# Patient Record
Sex: FEMALE | Race: WHITE | Employment: FULL TIME | URBAN - METROPOLITAN AREA
[De-identification: names, ages, dates, MRNs, and addresses within clinical notes are randomized per-mention and may not be internally consistent; named-entity substitution may affect disease eponyms.]

---

## 2023-06-05 ENCOUNTER — HOSPITAL ENCOUNTER (OUTPATIENT)
Age: 58
Discharge: HOME | End: 2023-06-05

## 2023-06-05 LAB — RUBV IGG SERPL IA-ACNC: 160.9 IU/ML

## 2023-06-07 LAB
MEV IGG SER-ACNC: 56.5 AU/ML
MUV IGG TITR SER: 3.74 {TITER}
VZV IGG SER QL IA: 3.17

## 2023-07-17 ENCOUNTER — OFFICE VISIT (OUTPATIENT)
Dept: FAMILY MEDICINE CLINIC | Age: 58
End: 2023-07-17
Payer: COMMERCIAL

## 2023-07-17 VITALS
WEIGHT: 122.4 LBS | TEMPERATURE: 98.6 F | RESPIRATION RATE: 16 BRPM | HEART RATE: 86 BPM | DIASTOLIC BLOOD PRESSURE: 82 MMHG | SYSTOLIC BLOOD PRESSURE: 118 MMHG | OXYGEN SATURATION: 98 %

## 2023-07-17 DIAGNOSIS — G25.0 ESSENTIAL TREMOR: Primary | ICD-10-CM

## 2023-07-17 DIAGNOSIS — Z00.00 ROUTINE HEALTH MAINTENANCE: ICD-10-CM

## 2023-07-17 DIAGNOSIS — J45.909 UNCOMPLICATED ASTHMA, UNSPECIFIED ASTHMA SEVERITY, UNSPECIFIED WHETHER PERSISTENT: ICD-10-CM

## 2023-07-17 DIAGNOSIS — N95.1 MENOPAUSAL SYMPTOMS: ICD-10-CM

## 2023-07-17 DIAGNOSIS — E78.5 HYPERLIPIDEMIA, UNSPECIFIED HYPERLIPIDEMIA TYPE: ICD-10-CM

## 2023-07-17 PROCEDURE — 99204 OFFICE O/P NEW MOD 45 MIN: CPT | Performed by: STUDENT IN AN ORGANIZED HEALTH CARE EDUCATION/TRAINING PROGRAM

## 2023-07-17 RX ORDER — ESTRADIOL 0.1 MG/G
CREAM VAGINAL
Qty: 42.5 G | Refills: 3 | Status: SHIPPED | OUTPATIENT
Start: 2023-07-17

## 2023-07-17 RX ORDER — PRIMIDONE 50 MG/1
50 TABLET ORAL 3 TIMES DAILY
Qty: 270 TABLET | Refills: 2 | Status: SHIPPED | OUTPATIENT
Start: 2023-07-17

## 2023-07-17 RX ORDER — ALBUTEROL SULFATE 90 UG/1
2 AEROSOL, METERED RESPIRATORY (INHALATION) 4 TIMES DAILY PRN
Qty: 18 G | Refills: 5 | Status: SHIPPED | OUTPATIENT
Start: 2023-07-17

## 2023-07-17 RX ORDER — FLUTICASONE PROPIONATE AND SALMETEROL 250; 50 UG/1; UG/1
1 POWDER RESPIRATORY (INHALATION)
COMMUNITY
Start: 2023-06-28 | End: 2023-07-17 | Stop reason: SDUPTHER

## 2023-07-17 RX ORDER — PROPRANOLOL HYDROCHLORIDE 10 MG/1
10 TABLET ORAL 3 TIMES DAILY
Qty: 270 TABLET | Refills: 2 | Status: SHIPPED | OUTPATIENT
Start: 2023-07-17

## 2023-07-17 RX ORDER — FLUTICASONE PROPIONATE AND SALMETEROL 250; 50 UG/1; UG/1
1 POWDER RESPIRATORY (INHALATION) 2 TIMES DAILY
Qty: 60 EACH | Refills: 3 | Status: SHIPPED | OUTPATIENT
Start: 2023-07-17

## 2023-07-17 RX ORDER — MONTELUKAST SODIUM 10 MG/1
10 TABLET ORAL DAILY
Qty: 90 TABLET | Refills: 2 | Status: SHIPPED | OUTPATIENT
Start: 2023-07-17

## 2023-07-17 SDOH — ECONOMIC STABILITY: INCOME INSECURITY: HOW HARD IS IT FOR YOU TO PAY FOR THE VERY BASICS LIKE FOOD, HOUSING, MEDICAL CARE, AND HEATING?: NOT HARD AT ALL

## 2023-07-17 SDOH — ECONOMIC STABILITY: FOOD INSECURITY: WITHIN THE PAST 12 MONTHS, YOU WORRIED THAT YOUR FOOD WOULD RUN OUT BEFORE YOU GOT MONEY TO BUY MORE.: NEVER TRUE

## 2023-07-17 SDOH — ECONOMIC STABILITY: HOUSING INSECURITY
IN THE LAST 12 MONTHS, WAS THERE A TIME WHEN YOU DID NOT HAVE A STEADY PLACE TO SLEEP OR SLEPT IN A SHELTER (INCLUDING NOW)?: NO

## 2023-07-17 SDOH — ECONOMIC STABILITY: FOOD INSECURITY: WITHIN THE PAST 12 MONTHS, THE FOOD YOU BOUGHT JUST DIDN'T LAST AND YOU DIDN'T HAVE MONEY TO GET MORE.: NEVER TRUE

## 2023-07-17 ASSESSMENT — PATIENT HEALTH QUESTIONNAIRE - PHQ9
1. LITTLE INTEREST OR PLEASURE IN DOING THINGS: 0
2. FEELING DOWN, DEPRESSED OR HOPELESS: 0
SUM OF ALL RESPONSES TO PHQ QUESTIONS 1-9: 0
SUM OF ALL RESPONSES TO PHQ QUESTIONS 1-9: 0
SUM OF ALL RESPONSES TO PHQ9 QUESTIONS 1 & 2: 0
SUM OF ALL RESPONSES TO PHQ QUESTIONS 1-9: 0
SUM OF ALL RESPONSES TO PHQ QUESTIONS 1-9: 0

## 2023-07-17 NOTE — PATIENT INSTRUCTIONS
Where to get your labs: 4 options  There are several locations near the family medicine clinic. Imaging services are at the main hospital (see 3rd option below)    1. [facility] stand-alone building with parking out front  [address] ([place], just across the street from the Emergency Dept / [address] [place] [address])  [facility], [address] [facility]  P: [phone]    Hours:  Casi Modest 6:00AM - 6:00PM  Fri 6 AM to 4 PM  Saturday 6:30AM - Noon      2. [facility] on the second floor of same building where you had your family medicine appointment  [address] [facility]. [address], [address] [place] [address]  P: [phone]    Hours:  Mon - Thu 8:30AM-3:00PM  Closed for lunch - 12:00pm - 1:00PM  Fri - Closed    3. [facility]  Can also get x-ray, CT scans, other imaging here  [address] [facility], [address] [place] [address],[address]  Tel: [phone]    Hours:  Emeka Birminghamt - Fri 6am to 5pm  Saturday - 6:00 am - 12:00 pm *xray,ct, other imaging services not available at this location on weekends. Sunday - Closed    4. [facility] and imaging centers:  [facility]/locations/specialty-locations/lab-and-imaging-centers  *Please call before you go to double check times and whether they can definitely obtain labs for you.

## 2023-07-17 NOTE — PROGRESS NOTES
[address] [address]  Dept: [phone]  Dept Fax: [phone]: [phone]  Resident Note    Assessment & Plan     1. Essential tremor    2. Hyperlipidemia, unspecified hyperlipidemia type    3. Routine health maintenance    4. Menopausal symptoms    5.  Uncomplicated asthma, unspecified asthma severity, unspecified whether persistent       Orders Placed This Encounter    CBC with Auto Differential     Standing Status:   Future     Number of Occurrences:   1     Standing Expiration Date:   7/16/2024    Comprehensive Metabolic Panel     Standing Status:   Future     Number of Occurrences:   1     Standing Expiration Date:   7/17/2024    Lipid, Fasting     Standing Status:   Future     Number of Occurrences:   1     Standing Expiration Date:   7/17/2024    TSH with Reflex     Standing Status:   Future     Number of Occurrences:   1     Standing Expiration Date:   7/16/2024    Vitamin B12 & Folate     Standing Status:   Future     Number of Occurrences:   1     Standing Expiration Date:   7/17/2024    Adam Washington DO, Pain Medicine, [place]     Referral Priority:   Routine     Referral Type:   Eval and Treat     Referral Reason:   Specialty Services Required     Referred to Provider:   Dylan Bauman DO     Requested Specialty:   Pain Medicine     Number of Visits Requested:   1    B COMPLEX-C PO     Sig: Take by mouth    CALCIUM CARBONATE-VITAMIN D PO     Sig: Take 1 tablet by mouth daily    Coenzyme Q10-Vitamin E 100-300 MG-UNIT WAFR     Sig: Take 200 mg by mouth daily    DISCONTD: fluticasone-salmeterol (ADVAIR) 250-50 MCG/ACT AEPB diskus inhaler     Sig: Inhale 1 puff into the lungs    NIACIN CR PO     Sig: Take by mouth    RED YEAST RICE EXTRACT PO     Sig: Take by mouth    primidone (MYSOLINE) 50 MG tablet     Sig: Take 1 tablet by mouth 3 times daily     Dispense:  270 tablet

## 2023-07-18 ENCOUNTER — HOSPITAL ENCOUNTER (OUTPATIENT)
Age: 58
Discharge: HOME | End: 2023-07-18
Payer: COMMERCIAL

## 2023-07-18 DIAGNOSIS — Z00.00 ROUTINE HEALTH MAINTENANCE: ICD-10-CM

## 2023-07-18 DIAGNOSIS — G25.0 ESSENTIAL TREMOR: ICD-10-CM

## 2023-07-18 DIAGNOSIS — E78.5 HYPERLIPIDEMIA, UNSPECIFIED HYPERLIPIDEMIA TYPE: ICD-10-CM

## 2023-07-18 LAB
ALBUMIN SERPL BCG-MCNC: 4.4 G/DL (ref 3.5–5.1)
ALP SERPL-CCNC: 107 U/L (ref 38–126)
ALT SERPL W/O P-5'-P-CCNC: 17 U/L (ref 11–66)
ANION GAP SERPL CALC-SCNC: 8 MEQ/L (ref 8–16)
AST SERPL-CCNC: 16 U/L (ref 5–40)
BASOPHILS ABSOLUTE: 0 THOU/MM3 (ref 0–0.1)
BASOPHILS NFR BLD AUTO: 0.6 %
BILIRUB SERPL-MCNC: 0.2 MG/DL (ref 0.3–1.2)
BUN SERPL-MCNC: 12 MG/DL (ref 7–22)
CALCIUM SERPL-MCNC: 9.8 MG/DL (ref 8.5–10.5)
CHLORIDE SERPL-SCNC: 104 MEQ/L (ref 98–111)
CHOLESTEROL, FASTING: 220 MG/DL (ref 100–199)
CO2 SERPL-SCNC: 28 MEQ/L (ref 23–33)
CREAT SERPL-MCNC: 0.6 MG/DL (ref 0.4–1.2)
DEPRECATED RDW RBC AUTO: 44.6 FL (ref 35–45)
EOSINOPHIL NFR BLD AUTO: 4.7 %
EOSINOPHILS ABSOLUTE: 0.3 THOU/MM3 (ref 0–0.4)
ERYTHROCYTE [DISTWIDTH] IN BLOOD BY AUTOMATED COUNT: 13.2 % (ref 11.5–14.5)
FOLATE SERPL-MCNC: 15.4 NG/ML (ref 4.8–24.2)
GFR SERPL CREATININE-BSD FRML MDRD: > 60 ML/MIN/1.73M2
GLUCOSE SERPL-MCNC: 93 MG/DL (ref 70–108)
HCT VFR BLD AUTO: 43.3 % (ref 37–47)
HDLC SERPL-MCNC: 72 MG/DL
HGB BLD-MCNC: 14.1 GM/DL (ref 12–16)
IMM GRANULOCYTES # BLD AUTO: 0.02 THOU/MM3 (ref 0–0.07)
IMM GRANULOCYTES NFR BLD AUTO: 0.3 %
LDLC SERPL CALC-MCNC: 131 MG/DL
LYMPHOCYTES ABSOLUTE: 2.2 THOU/MM3 (ref 1–4.8)
LYMPHOCYTES NFR BLD AUTO: 34.7 %
MCH RBC QN AUTO: 30.1 PG (ref 26–33)
MCHC RBC AUTO-ENTMCNC: 32.6 GM/DL (ref 32.2–35.5)
MCV RBC AUTO: 92.3 FL (ref 81–99)
MONOCYTES ABSOLUTE: 0.3 THOU/MM3 (ref 0.4–1.3)
MONOCYTES NFR BLD AUTO: 5.4 %
NEUTROPHILS NFR BLD AUTO: 54.3 %
NRBC BLD AUTO-RTO: 0 /100 WBC
PLATELET # BLD AUTO: 319 THOU/MM3 (ref 130–400)
PMV BLD AUTO: 9.3 FL (ref 9.4–12.4)
POTASSIUM SERPL-SCNC: 4.8 MEQ/L (ref 3.5–5.2)
PROT SERPL-MCNC: 7.2 G/DL (ref 6.1–8)
RBC # BLD AUTO: 4.69 MILL/MM3 (ref 4.2–5.4)
SEGMENTED NEUTROPHILS ABSOLUTE COUNT: 3.4 THOU/MM3 (ref 1.8–7.7)
SODIUM SERPL-SCNC: 140 MEQ/L (ref 135–145)
TRIGLYCERIDE, FASTING: 86 MG/DL (ref 0–199)
TSH SERPL DL<=0.005 MIU/L-ACNC: 2.16 UIU/ML (ref 0.4–4.2)
VIT B12 SERPL-MCNC: 440 PG/ML (ref 211–911)
WBC # BLD AUTO: 6.2 THOU/MM3 (ref 4.8–10.8)

## 2023-07-18 PROCEDURE — 36415 COLL VENOUS BLD VENIPUNCTURE: CPT

## 2023-07-18 PROCEDURE — 82746 ASSAY OF FOLIC ACID SERUM: CPT

## 2023-07-18 PROCEDURE — 82607 VITAMIN B-12: CPT

## 2023-07-18 PROCEDURE — 85025 COMPLETE CBC W/AUTO DIFF WBC: CPT

## 2023-07-18 PROCEDURE — 80061 LIPID PANEL: CPT

## 2023-07-18 PROCEDURE — 84443 ASSAY THYROID STIM HORMONE: CPT

## 2023-07-18 PROCEDURE — 80053 COMPREHEN METABOLIC PANEL: CPT

## 2023-07-19 PROBLEM — Z90.49 S/P APPENDECTOMY: Status: ACTIVE | Noted: 2022-11-17

## 2023-07-19 PROBLEM — F51.5 NIGHTMARES ASSOCIATED WITH CHRONIC POST-TRAUMATIC STRESS DISORDER: Status: ACTIVE | Noted: 2023-01-26

## 2023-07-19 PROBLEM — F43.12 NIGHTMARES ASSOCIATED WITH CHRONIC POST-TRAUMATIC STRESS DISORDER: Status: ACTIVE | Noted: 2023-01-26

## 2023-07-19 PROBLEM — F43.10 PTSD (POST-TRAUMATIC STRESS DISORDER): Status: ACTIVE | Noted: 2023-01-26

## 2023-07-19 PROBLEM — Z90.710 HX OF TOTAL HYSTERECTOMY: Status: ACTIVE | Noted: 2019-02-15

## 2023-07-19 PROBLEM — K21.9 GERD (GASTROESOPHAGEAL REFLUX DISEASE): Status: ACTIVE | Noted: 2022-01-04

## 2023-07-19 RX ORDER — LACTOBACILLUS RHAMNOSUS GG 10B CELL
CAPSULE ORAL
COMMUNITY
Start: 2022-02-10 | End: 2024-02-10

## 2023-07-19 RX ORDER — GARLIC 180 MG
TABLET, DELAYED RELEASE (ENTERIC COATED) ORAL
COMMUNITY

## 2023-07-19 RX ORDER — VITAMIN E 268 MG
400 CAPSULE ORAL DAILY
COMMUNITY

## 2023-07-19 ASSESSMENT — ENCOUNTER SYMPTOMS
COUGH: 0
ABDOMINAL PAIN: 0
SHORTNESS OF BREATH: 0

## 2023-07-20 ENCOUNTER — TELEPHONE (OUTPATIENT)
Dept: FAMILY MEDICINE CLINIC | Age: 58
End: 2023-07-20

## 2023-07-20 NOTE — TELEPHONE ENCOUNTER
----- Message from Honor Buerger, MD sent at 7/19/2023  9:02 AM EDT -----  Cholesterol is slightly high - would advise reducing dietary fat intake. Rest of labs are great.

## 2023-09-05 ENCOUNTER — OFFICE VISIT (OUTPATIENT)
Dept: FAMILY MEDICINE CLINIC | Age: 58
End: 2023-09-05
Payer: COMMERCIAL

## 2023-09-05 VITALS
HEART RATE: 68 BPM | HEIGHT: 60 IN | WEIGHT: 116.4 LBS | OXYGEN SATURATION: 98 % | BODY MASS INDEX: 22.85 KG/M2 | DIASTOLIC BLOOD PRESSURE: 70 MMHG | SYSTOLIC BLOOD PRESSURE: 110 MMHG | TEMPERATURE: 98.2 F | RESPIRATION RATE: 16 BRPM

## 2023-09-05 DIAGNOSIS — J01.90 ACUTE BACTERIAL SINUSITIS: Primary | ICD-10-CM

## 2023-09-05 DIAGNOSIS — B96.89 ACUTE BACTERIAL SINUSITIS: Primary | ICD-10-CM

## 2023-09-05 PROCEDURE — 99213 OFFICE O/P EST LOW 20 MIN: CPT

## 2023-09-05 RX ORDER — PREDNISONE 20 MG/1
40 TABLET ORAL DAILY
Qty: 10 TABLET | Refills: 0 | Status: SHIPPED | OUTPATIENT
Start: 2023-09-05 | End: 2023-09-10

## 2023-09-05 RX ORDER — AMOXICILLIN AND CLAVULANATE POTASSIUM 875; 125 MG/1; MG/1
1 TABLET, FILM COATED ORAL 2 TIMES DAILY
Qty: 14 TABLET | Refills: 0 | Status: SHIPPED | OUTPATIENT
Start: 2023-09-05 | End: 2023-09-12

## 2023-09-05 ASSESSMENT — ENCOUNTER SYMPTOMS
SINUS PAIN: 1
RHINORRHEA: 1
DIARRHEA: 1
SORE THROAT: 1
ABDOMINAL PAIN: 0
CONSTIPATION: 0
SHORTNESS OF BREATH: 1

## 2023-09-05 NOTE — PATIENT INSTRUCTIONS
Thank you   Thank you for trusting us with your healthcare needs. You may receive a survey regarding today's visit. It would help us out if you would take a few moments to provide your feedback. We value your input. Please bring in ALL medications BOTTLES, including inhalers, herbal supplements, over the counter, prescribed & non-prescribed medicine. The office would like actual medication bottles and a list.   Please note our OFFICE POLICIES:   Prior to getting your labs drawn, please check with your insurance company for benefits and eligibility of lab services. Often, insurance companies cover certain tests for preventative visits only. It is patient's responsibility to see what is covered. We are unable to change a diagnosis after the test has been performed. Please hold onto your original lab orders and take them to your lab to be completed. If you no show your scheduled appointment three times, you will be dismissed from this practice. Reschedules must be completed 24 hours prior to your schedule appointment. If the list below has been completed, PLEASE FAX RECORDS TO OUR OFFICE @ [phone].  Once the records have been received we will update your records at our office:  Health Maintenance Due   Topic Date Due    HIV screen  Never done    Hepatitis C screen  Never done    Breast cancer screen  Never done    Colorectal Cancer Screen  Never done    Shingles vaccine (2 of 3) 03/10/2017    COVID-19 Vaccine (4 - Booster for Moderna series) 01/03/2022    Flu vaccine (1) 08/01/2023

## 2023-09-25 ENCOUNTER — OFFICE VISIT (OUTPATIENT)
Dept: PHYSICAL MEDICINE AND REHAB | Age: 58
End: 2023-09-25
Payer: COMMERCIAL

## 2023-09-25 VITALS
HEIGHT: 60 IN | WEIGHT: 119 LBS | SYSTOLIC BLOOD PRESSURE: 112 MMHG | BODY MASS INDEX: 23.36 KG/M2 | DIASTOLIC BLOOD PRESSURE: 78 MMHG

## 2023-09-25 DIAGNOSIS — M47.812 CERVICAL SPONDYLOSIS: ICD-10-CM

## 2023-09-25 DIAGNOSIS — G24.3 CERVICAL DYSTONIA: Primary | ICD-10-CM

## 2023-09-25 DIAGNOSIS — G25.0 ESSENTIAL TREMOR: ICD-10-CM

## 2023-09-25 DIAGNOSIS — G89.29 OTHER CHRONIC PAIN: ICD-10-CM

## 2023-09-25 PROCEDURE — 99204 OFFICE O/P NEW MOD 45 MIN: CPT | Performed by: ANESTHESIOLOGY

## 2023-09-25 NOTE — PROGRESS NOTES
Functionality Assessment/Goals Worksheet     On a scale of 0 (Does not Interfere) to 10 (Completely Interferes)     1. Which number describes how during the past week pain has interfered with           the following:  A. General Activity:  0  B. Mood: 0  C. Walking Ability:  0  D. Normal Work (Includes both work outside the home and housework):  0  E. Relations with Other People:   0  F. Sleep:   0  G. Enjoyment of Life:   0    2. Patient Prefers to Take their Pain Medications:     []  On a regular basis   []  Only when necessary    [x]  Does not take pain medications    3. What are the Patient's Goals/Expectations for Visiting Pain Management?      []  Learn about my pain    []  Receive Medication   []  Physical Therapy     []  Treat Depression   [x]  Receive Injections    []  Treat Sleep   []  Deal with Anxiety and Stress   []  Treat Opoid Dependence/Addiction   []  Other:

## 2023-09-25 NOTE — PROGRESS NOTES
Chronic Pain/PM&R Clinic Note     Encounter Date: 9/25/23    Subjective:   Chief Complaint:   Chief Complaint   Patient presents with    New Patient     Discuss botox for cervical pain        History of Present Illness:   [place] is a 63 y.o. female seen in the clinic initially on 09/25/23 upon request from Emili Peguero MD (PCP) for her history of chronic neck pain and dystonia with tremors. She has a medical history of rhinoplasty for cosmetic purposes complicated by septal deviation and nose surgeries and septoplasties. She states that she has been dealing with worsening neck pain, abnormal neck posturing and tremor since 2021 when she lived in [place]. She states that this causes a lot of interference with her ability to function with her job. She describes a constant pain in her neck and states that it is hard for her to function doing things without have to take medication. She states that she was getting Botox previously in her neck with significant relief in [place] and in [place], [place]. She states that she does not like to be dependent to repeat her Botox injections to treat her cervical dystonia and tremors.         History of Interventions:   Surgery: No previous cervical surgeries  Injections: Botox inj - effective in fast    Imaging/Studies:  None    Past Medical History:   Diagnosis Date    Asthma     Benign essential tremor     Dystonia     With Tremors       Past Surgical History:   Procedure Laterality Date    APPENDECTOMY  2022    GALLBLADDER SURGERY      HYSTERECTOMY, VAGINAL      RHINOPLASTY      TURBINATE RESECTION         Family History   Problem Relation Age of Onset    Breast Cancer Mother     Cervical Cancer Mother     Diabetes Mother     Hypertension Mother     Elevated Lipids Mother     Diabetes Father     Breast Cancer Maternal Grandmother     Diabetes Maternal Grandmother          Medications & Allergies:   Current Outpatient Medications   Medication Instructions

## 2023-10-11 ENCOUNTER — TELEPHONE (OUTPATIENT)
Dept: FAMILY MEDICINE CLINIC | Age: 58
End: 2023-10-11

## 2023-10-11 DIAGNOSIS — E78.5 HYPERLIPIDEMIA, UNSPECIFIED HYPERLIPIDEMIA TYPE: Primary | ICD-10-CM

## 2023-10-12 ENCOUNTER — HOSPITAL ENCOUNTER (OUTPATIENT)
Age: 58
Discharge: HOME | End: 2023-10-12
Payer: COMMERCIAL

## 2023-10-12 DIAGNOSIS — E78.5 HYPERLIPIDEMIA, UNSPECIFIED HYPERLIPIDEMIA TYPE: ICD-10-CM

## 2023-10-12 LAB
CHOLESTEROL, FASTING: 185 MG/DL (ref 100–199)
HDLC SERPL-MCNC: 63 MG/DL
LDLC SERPL CALC-MCNC: 104 MG/DL
TRIGLYCERIDE, FASTING: 90 MG/DL (ref 0–199)

## 2023-10-12 PROCEDURE — 36415 COLL VENOUS BLD VENIPUNCTURE: CPT

## 2023-10-12 PROCEDURE — 80061 LIPID PANEL: CPT

## 2023-12-01 ENCOUNTER — TELEPHONE (OUTPATIENT)
Dept: FAMILY MEDICINE CLINIC | Age: 58
End: 2023-12-01

## 2023-12-01 ENCOUNTER — OFFICE VISIT (OUTPATIENT)
Dept: FAMILY MEDICINE CLINIC | Age: 58
End: 2023-12-01
Payer: COMMERCIAL

## 2023-12-01 VITALS
SYSTOLIC BLOOD PRESSURE: 114 MMHG | OXYGEN SATURATION: 97 % | HEIGHT: 60 IN | BODY MASS INDEX: 23.2 KG/M2 | TEMPERATURE: 98.2 F | RESPIRATION RATE: 16 BRPM | HEART RATE: 98 BPM | DIASTOLIC BLOOD PRESSURE: 70 MMHG | WEIGHT: 118.2 LBS

## 2023-12-01 DIAGNOSIS — J45.21 MILD INTERMITTENT ASTHMA WITH ACUTE EXACERBATION: Primary | ICD-10-CM

## 2023-12-01 PROCEDURE — 99213 OFFICE O/P EST LOW 20 MIN: CPT | Performed by: STUDENT IN AN ORGANIZED HEALTH CARE EDUCATION/TRAINING PROGRAM

## 2023-12-01 RX ORDER — PREDNISONE 20 MG/1
40 TABLET ORAL DAILY
Qty: 10 TABLET | Refills: 0 | Status: SHIPPED | OUTPATIENT
Start: 2023-12-01 | End: 2023-12-06

## 2023-12-01 RX ORDER — VENLAFAXINE HYDROCHLORIDE 37.5 MG/1
37.5 CAPSULE, EXTENDED RELEASE ORAL DAILY
COMMUNITY

## 2023-12-01 NOTE — PROGRESS NOTES
[address] [address]  Dept: [phone]  Dept Fax: [phone]: [phone]  Resident Note    Assessment & Plan:    1. Mild intermittent asthma with acute exacerbation       Orders Placed This Encounter    venlafaxine (EFFEXOR XR) 37.5 MG extended release capsule     Sig: Take 1 capsule by mouth daily    predniSONE (DELTASONE) 20 MG tablet     Sig: Take 2 tablets by mouth daily for 5 days     Dispense:  10 tablet     Refill:  0       Albuterol - 2 to 4 inhalations every 20 minutes for 3 doses; if good response, can lengthen interval to every 3 to 4 hours as needed; if incomplete response, can lengthen interval to every 1 to 3 hours as needed    Short course of oral prednisone. Return if symptoms worsen or fail to improve. Future Appointments   Date Time Provider [address] [place] [address]   2/29/2024  4:00 PM Iesha, [address] [place] [address]       HPI    Randolph Britt is a 63 y.o. female who presents today for:    Chief Complaint   Patient presents with    Illness     Patient in office today, tested positive for covid yesterday 11/30/2023. Congestion, sore throat, SOB, denies dizziness     Same-day visit for an acute asthma exacerbation in setting of recent viral URI. Reports that she is worried that her cough might prevent her from obtaining a much-needed Botox injection for her tremors this coming week. Reports some mild shortness of breath, cough, congestion. Otherwise, no fevers. Review of Systems   Constitutional: Negative. HENT:  Positive for congestion and rhinorrhea. Eyes: Negative. Respiratory:  Positive for cough and shortness of breath. Cardiovascular: Negative. Gastrointestinal: Negative.          No results found for: \"LABA1C\", \"[id number]\"   No results found for: \"MALBCR\"   Lab Results   Component Value Date    HDL 63 10/12/2023    LDLCALC 104 10/12/2023

## 2023-12-01 NOTE — TELEPHONE ENCOUNTER
Pt has came down with Covid and having some trouble with breathing since she has asthma. States she has previously done remdisbir before when sick to help and would like to have this called into pharmacy.  Please advise     [facility] [place]

## 2023-12-01 NOTE — PATIENT INSTRUCTIONS
Albuterol: 2 to 4 inhalations every 20 minutes for 3 doses; if good response, can lengthen interval to every 3 to 4 hours as needed; if incomplete response, can lengthen interval to every 1 to 3 hours as needed    1000 mg 3 times / day for tylenol    Double advair dose for 1 week or 2 weeks if needed

## 2023-12-04 ENCOUNTER — PATIENT MESSAGE (OUTPATIENT)
Dept: FAMILY MEDICINE CLINIC | Age: 58
End: 2023-12-04

## 2023-12-04 DIAGNOSIS — R05.2 SUBACUTE COUGH: Primary | ICD-10-CM

## 2023-12-04 RX ORDER — ACETAMINOPHEN AND CODEINE PHOSPHATE 300; 30 MG/1; MG/1
1 TABLET ORAL EVERY 4 HOURS PRN
Qty: 12 TABLET | Refills: 0 | Status: SHIPPED | OUTPATIENT
Start: 2023-12-04 | End: 2023-12-06

## 2023-12-04 RX ORDER — AZITHROMYCIN 250 MG/1
250 TABLET, FILM COATED ORAL SEE ADMIN INSTRUCTIONS
Qty: 6 TABLET | Refills: 0 | Status: SHIPPED | OUTPATIENT
Start: 2023-12-04 | End: 2023-12-09

## 2023-12-04 NOTE — TELEPHONE ENCOUNTER
From: [place]  To: Roula Beltran MD  Sent: 12/4/2023 7:41 AM EST  Subject: Thank you     Dear Dr. Talita Jason,   Thank you for your wonderful care on Friday. I have followed all of your instructions. The increased usage of the inhalers has been very beneficial, and I feel like I am now able to get a deep breath. I am still struggling with the deep green phlegm, intense headaches, and the coughing spells. I am still testing positive for Covid , but that is to be expected. Are you open to the prescription of an antibiotic as we discussed? My request is based on my strong desire to return to work and keep my scheduled appointment with Dr. Ben Beard for my much- needed Botox injections. Thank you for listening to me. Sincerely, Burgess Foy.

## 2023-12-07 ENCOUNTER — OFFICE VISIT (OUTPATIENT)
Dept: PHYSICAL MEDICINE AND REHAB | Age: 58
End: 2023-12-07
Payer: COMMERCIAL

## 2023-12-07 VITALS
WEIGHT: 118.17 LBS | SYSTOLIC BLOOD PRESSURE: 116 MMHG | BODY MASS INDEX: 23.2 KG/M2 | HEIGHT: 60 IN | DIASTOLIC BLOOD PRESSURE: 68 MMHG

## 2023-12-07 DIAGNOSIS — G25.0 ESSENTIAL TREMOR: ICD-10-CM

## 2023-12-07 DIAGNOSIS — G24.3 CERVICAL DYSTONIA: Primary | ICD-10-CM

## 2023-12-07 DIAGNOSIS — G89.29 OTHER CHRONIC PAIN: ICD-10-CM

## 2023-12-07 DIAGNOSIS — M47.812 CERVICAL SPONDYLOSIS: ICD-10-CM

## 2023-12-07 PROCEDURE — 99213 OFFICE O/P EST LOW 20 MIN: CPT | Performed by: ANESTHESIOLOGY

## 2023-12-07 PROCEDURE — 64616 CHEMODENERV MUSC NECK DYSTON: CPT | Performed by: ANESTHESIOLOGY

## 2023-12-07 NOTE — PROGRESS NOTES
Pre-operative Diagnosis: Cervical Dystonia     Post-operative Diagnosis: Cervical Dystonia     Procedure: Botox for Cervical Dystonia     Procedure Description:  Patient was seated on the examination table. Botox was drawn up into a tuberculin syringes, to a concentration of 5 units per 0.1 mL with a 30-gauge needle. Skin was prepped with alcohol wipes. The following muscles were injected after negative aspiration; trapezius muscle bilaterally, cervical paraspinal muscles bilaterally, and rhomboid muscles bilaterally for a total of 200 units. The patient tolerated the procedure well.     Medications: 4 mL in 200 U Botox drawn up in 4 separate 1 mL TB syringes = 5 U per 0.1 mL syringe    Amount medication wasted: 0 units        Procedural Complications: None        Adam Julian DO  Interventional Pain Management/PM&R   [address]

## 2023-12-07 NOTE — PROGRESS NOTES
[address] [facility]  Dept: [phone]  Dept Fax: [phone]: [phone]    Visit Date: 12/7/2023    Functionality Assessment/Goals Worksheet     On a scale of 0 (Does not Interfere) to 10 (Completely Interferes)     1. Which number describes how during the past week pain has interfered with       the following:  A. General Activity:  0  B. Mood: 0  C. Walking Ability:  0  D. Normal Work (Includes both work outside the home and housework):  1  E. Relations with Other People:   0  F. Sleep:   0  G. Enjoyment of Life:   0    2. Patient Prefers to Take their Pain Medications:     []  On a regular basis   []  Only when necessary    []  Does not take pain medications    3. What are the Patient's Goals/Expectations for Visiting Pain Management?      []  Learn about my pain    []  Receive Medication   []  Physical Therapy     []  Treat Depression   []  Receive Injections    []  Treat Sleep   []  Deal with Anxiety and Stress   []  Treat Opoid Dependence/Addiction   []  Other:

## 2023-12-07 NOTE — PROGRESS NOTES
Chronic Pain/PM&R Clinic Note     Encounter Date: 12/7/23    Subjective:   Chief Complaint:   Chief Complaint   Patient presents with    Follow-up     Botox       History of Present Illness:   [place] is a 63 y.o. female seen in the clinic initially on 09/25/23 upon request from Nadeen Nieto MD (PCP) for her history of chronic neck pain and dystonia with tremors. She has a medical history of rhinoplasty for cosmetic purposes complicated by septal deviation and nose surgeries and septoplasties. She states that she has been dealing with worsening neck pain, abnormal neck posturing and tremor since 2021 when she lived in [place]. She states that this causes a lot of interference with her ability to function with her job. She describes a constant pain in her neck and states that it is hard for her to function doing things without have to take medication. She states that she was getting Botox previously in her neck with significant relief in [place] and in [place], [place]. She states that she does not like to be dependent to repeat her Botox injections to treat her cervical dystonia and tremors. Today, 12/7/2023, patient presents for planned follow-up for management of cervical neck pain and cervical dystonia. She states that her symptoms have not changed much since her last visit. She does continue have a lot of pain in her neck that she describes at the base of her shoulders with radiation up her neck into the base of her occiput. She denies any other new symptoms pain rating further down the arm, focal weakness, numbness/tingling, worsening gait disturbance. She is wondering what to expect from her Botox injection at this visit.     History of Interventions:   Surgery: No previous cervical surgeries  Injections: Botox inj - effective in fast    Imaging/Studies:  None    Past Medical History:   Diagnosis Date    Asthma     Benign essential tremor     Dystonia     With Tremors    Dystonia

## 2023-12-08 ASSESSMENT — ENCOUNTER SYMPTOMS
COUGH: 1
SHORTNESS OF BREATH: 1
EYES NEGATIVE: 1
RHINORRHEA: 1
GASTROINTESTINAL NEGATIVE: 1

## 2023-12-13 ENCOUNTER — COMMUNITY OUTREACH (OUTPATIENT)
Dept: FAMILY MEDICINE CLINIC | Age: 58
End: 2023-12-13

## 2023-12-29 ENCOUNTER — TELEPHONE (OUTPATIENT)
Dept: FAMILY MEDICINE CLINIC | Age: 58
End: 2023-12-29

## 2023-12-29 DIAGNOSIS — F33.0 MAJOR DEPRESSIVE DISORDER, RECURRENT EPISODE, MILD (HCC): Primary | ICD-10-CM

## 2023-12-29 NOTE — TELEPHONE ENCOUNTER
Patient stopped in office with a couple questions. She has 10 days left of her antidepressant Efexor, and she was wondering if she needs a referral to see a psychiatrist here? Or if that is a medication you can prescribe. She would like to def have that referral though for the psychiatrist in this building, 3rd floor.

## 2024-01-02 RX ORDER — VENLAFAXINE HYDROCHLORIDE 37.5 MG/1
37.5 CAPSULE, EXTENDED RELEASE ORAL DAILY
Qty: 90 CAPSULE | Refills: 2 | Status: SHIPPED | OUTPATIENT
Start: 2024-01-02

## 2024-01-03 NOTE — TELEPHONE ENCOUNTER
Patient returning phone call. I gave her the information/note from the provider. She said that she feels like her symptoms are being very well managed and does not feel like she needs a referral unless the provider needs her to see someone else to manage her meds. I let her know that script is showing 90 day supply with 2 refills, and she asked to clarify that she understood message from provider correctly that he will continue refilling this medication for her the next time she needs refills?

## 2024-01-03 NOTE — TELEPHONE ENCOUNTER
Effexor refilled for patient.    Please let patient know that I can definitely refer to psychiatry. If it's depressive symptoms, psychiatry prefers us to manage this as they are overbooked and cannot take on more patients easily. If it's other issues related to mental health, please make patient an appt to discuss with me so I can generate an accurate referral for psychiatry to assess whether patient is able to be seen by them. Thanks!

## 2024-01-08 NOTE — TELEPHONE ENCOUNTER
Carolina called back I told her that Dr Ortiz was okay prescribing her effexor, she said thank you and she already picked up.

## 2024-02-26 ENCOUNTER — TELEPHONE (OUTPATIENT)
Dept: FAMILY MEDICINE CLINIC | Age: 59
End: 2024-02-26

## 2024-02-26 DIAGNOSIS — E78.5 HYPERLIPIDEMIA, UNSPECIFIED HYPERLIPIDEMIA TYPE: Primary | ICD-10-CM

## 2024-02-26 NOTE — TELEPHONE ENCOUNTER
Patient came in and states \" we needed a repeat LDL but it went down and we never did it, I was wondering if he can put in a new order for it before my next appointment \"

## 2024-02-28 ENCOUNTER — HOSPITAL ENCOUNTER (OUTPATIENT)
Age: 59
Discharge: HOME | End: 2024-02-28
Payer: COMMERCIAL

## 2024-02-28 DIAGNOSIS — E78.5 HYPERLIPIDEMIA, UNSPECIFIED HYPERLIPIDEMIA TYPE: ICD-10-CM

## 2024-02-28 LAB
CHOLESTEROL, FASTING: 210 MG/DL (ref 100–199)
HDLC SERPL-MCNC: 72 MG/DL
LDLC SERPL CALC-MCNC: 120 MG/DL
TRIGLYCERIDE, FASTING: 92 MG/DL (ref 0–199)

## 2024-02-28 PROCEDURE — 80061 LIPID PANEL: CPT

## 2024-02-28 PROCEDURE — 36415 COLL VENOUS BLD VENIPUNCTURE: CPT

## 2024-03-04 ENCOUNTER — HOSPITAL ENCOUNTER (EMERGENCY)
Age: 59
Discharge: HOME | End: 2024-03-04
Attending: EMERGENCY MEDICINE
Payer: COMMERCIAL

## 2024-03-04 ENCOUNTER — APPOINTMENT (OUTPATIENT)
Dept: CT IMAGING | Age: 59
End: 2024-03-04
Payer: COMMERCIAL

## 2024-03-04 ENCOUNTER — APPOINTMENT (OUTPATIENT)
Dept: GENERAL RADIOLOGY | Age: 59
End: 2024-03-04
Payer: COMMERCIAL

## 2024-03-04 VITALS
WEIGHT: 119 LBS | HEART RATE: 84 BPM | BODY MASS INDEX: 23.36 KG/M2 | SYSTOLIC BLOOD PRESSURE: 112 MMHG | RESPIRATION RATE: 16 BRPM | HEIGHT: 60 IN | OXYGEN SATURATION: 99 % | DIASTOLIC BLOOD PRESSURE: 63 MMHG | TEMPERATURE: 97.2 F

## 2024-03-04 DIAGNOSIS — R42 DIZZINESS: Primary | ICD-10-CM

## 2024-03-04 DIAGNOSIS — R68.84 JAW PAIN: ICD-10-CM

## 2024-03-04 DIAGNOSIS — R11.0 NAUSEA: ICD-10-CM

## 2024-03-04 LAB
ALBUMIN SERPL BCG-MCNC: 4.3 G/DL (ref 3.5–5.1)
ALP SERPL-CCNC: 101 U/L (ref 38–126)
ALT SERPL W/O P-5'-P-CCNC: 44 U/L (ref 11–66)
ANION GAP SERPL CALC-SCNC: 10 MEQ/L (ref 8–16)
AST SERPL-CCNC: 27 U/L (ref 5–40)
BACTERIA URNS QL MICRO: ABNORMAL /HPF
BASOPHILS ABSOLUTE: 0 THOU/MM3 (ref 0–0.1)
BASOPHILS NFR BLD AUTO: 0.5 %
BILIRUB CONJ SERPL-MCNC: < 0.2 MG/DL (ref 0–0.3)
BILIRUB SERPL-MCNC: 0.3 MG/DL (ref 0.3–1.2)
BILIRUB UR QL STRIP.AUTO: NEGATIVE
BUN SERPL-MCNC: 11 MG/DL (ref 7–22)
CALCIUM SERPL-MCNC: 9.3 MG/DL (ref 8.5–10.5)
CASTS #/AREA URNS LPF: ABNORMAL /LPF
CASTS 2: ABNORMAL /LPF
CHARACTER UR: CLEAR
CHLORIDE SERPL-SCNC: 103 MEQ/L (ref 98–111)
CO2 SERPL-SCNC: 25 MEQ/L (ref 23–33)
COLOR: YELLOW
CREAT SERPL-MCNC: 0.5 MG/DL (ref 0.4–1.2)
CRYSTALS URNS MICRO: ABNORMAL
DEPRECATED RDW RBC AUTO: 44.1 FL (ref 35–45)
EOSINOPHIL NFR BLD AUTO: 4.3 %
EOSINOPHILS ABSOLUTE: 0.2 THOU/MM3 (ref 0–0.4)
EPITHELIAL CELLS, UA: ABNORMAL /HPF
ERYTHROCYTE [DISTWIDTH] IN BLOOD BY AUTOMATED COUNT: 13 % (ref 11.5–14.5)
GFR SERPL CREATININE-BSD FRML MDRD: > 60 ML/MIN/1.73M2
GLUCOSE SERPL-MCNC: 90 MG/DL (ref 70–108)
GLUCOSE UR QL STRIP.AUTO: NEGATIVE MG/DL
HCT VFR BLD AUTO: 42.3 % (ref 37–47)
HGB BLD-MCNC: 14.1 GM/DL (ref 12–16)
HGB UR QL STRIP.AUTO: ABNORMAL
IMM GRANULOCYTES # BLD AUTO: 0.01 THOU/MM3 (ref 0–0.07)
IMM GRANULOCYTES NFR BLD AUTO: 0.2 %
KETONES UR QL STRIP.AUTO: NEGATIVE
LYMPHOCYTES ABSOLUTE: 2.2 THOU/MM3 (ref 1–4.8)
LYMPHOCYTES NFR BLD AUTO: 39.8 %
MAGNESIUM SERPL-MCNC: 2 MG/DL (ref 1.6–2.4)
MCH RBC QN AUTO: 30.7 PG (ref 26–33)
MCHC RBC AUTO-ENTMCNC: 33.3 GM/DL (ref 32.2–35.5)
MCV RBC AUTO: 92 FL (ref 81–99)
MISCELLANEOUS 2: ABNORMAL
MONOCYTES ABSOLUTE: 0.3 THOU/MM3 (ref 0.4–1.3)
MONOCYTES NFR BLD AUTO: 5.9 %
NEUTROPHILS NFR BLD AUTO: 49.3 %
NITRITE UR QL STRIP: NEGATIVE
NRBC BLD AUTO-RTO: 0 /100 WBC
NT-PROBNP SERPL IA-MCNC: < 36 PG/ML (ref 0–124)
OSMOLALITY SERPL CALC.SUM OF ELEC: 274.6 MOSMOL/KG (ref 275–300)
PH UR STRIP.AUTO: 6.5 [PH] (ref 5–9)
PLATELET # BLD AUTO: 297 THOU/MM3 (ref 130–400)
PMV BLD AUTO: 9.2 FL (ref 9.4–12.4)
POTASSIUM SERPL-SCNC: 4.3 MEQ/L (ref 3.5–5.2)
PROT SERPL-MCNC: 7.1 G/DL (ref 6.1–8)
PROT UR STRIP.AUTO-MCNC: NEGATIVE MG/DL
RBC # BLD AUTO: 4.6 MILL/MM3 (ref 4.2–5.4)
RBC URINE: ABNORMAL /HPF
RENAL EPI CELLS #/AREA URNS HPF: ABNORMAL /[HPF]
SEGMENTED NEUTROPHILS ABSOLUTE COUNT: 2.8 THOU/MM3 (ref 1.8–7.7)
SODIUM SERPL-SCNC: 138 MEQ/L (ref 135–145)
SP GR UR REFRACT.AUTO: > 1.03 (ref 1–1.03)
TROPONIN, HIGH SENSITIVITY: < 6 NG/L (ref 0–12)
TROPONIN, HIGH SENSITIVITY: < 6 NG/L (ref 0–12)
UROBILINOGEN, URINE: 0.2 EU/DL (ref 0–1)
WBC # BLD AUTO: 5.6 THOU/MM3 (ref 4.8–10.8)
WBC #/AREA URNS HPF: ABNORMAL /HPF
WBC #/AREA URNS HPF: NEGATIVE /[HPF]
YEAST LIKE FUNGI URNS QL MICRO: ABNORMAL

## 2024-03-04 PROCEDURE — 2580000003 HC RX 258: Performed by: STUDENT IN AN ORGANIZED HEALTH CARE EDUCATION/TRAINING PROGRAM

## 2024-03-04 PROCEDURE — 84484 ASSAY OF TROPONIN QUANT: CPT

## 2024-03-04 PROCEDURE — 70450 CT HEAD/BRAIN W/O DYE: CPT

## 2024-03-04 PROCEDURE — 81001 URINALYSIS AUTO W/SCOPE: CPT

## 2024-03-04 PROCEDURE — 80053 COMPREHEN METABOLIC PANEL: CPT

## 2024-03-04 PROCEDURE — 70498 CT ANGIOGRAPHY NECK: CPT

## 2024-03-04 PROCEDURE — 6360000002 HC RX W HCPCS: Performed by: STUDENT IN AN ORGANIZED HEALTH CARE EDUCATION/TRAINING PROGRAM

## 2024-03-04 PROCEDURE — 83735 ASSAY OF MAGNESIUM: CPT

## 2024-03-04 PROCEDURE — 93010 ELECTROCARDIOGRAM REPORT: CPT | Performed by: NUCLEAR MEDICINE

## 2024-03-04 PROCEDURE — 99285 EMERGENCY DEPT VISIT HI MDM: CPT

## 2024-03-04 PROCEDURE — 93005 ELECTROCARDIOGRAM TRACING: CPT | Performed by: EMERGENCY MEDICINE

## 2024-03-04 PROCEDURE — 96374 THER/PROPH/DIAG INJ IV PUSH: CPT

## 2024-03-04 PROCEDURE — 70496 CT ANGIOGRAPHY HEAD: CPT

## 2024-03-04 PROCEDURE — 83880 ASSAY OF NATRIURETIC PEPTIDE: CPT

## 2024-03-04 PROCEDURE — 36415 COLL VENOUS BLD VENIPUNCTURE: CPT

## 2024-03-04 PROCEDURE — 85025 COMPLETE CBC W/AUTO DIFF WBC: CPT

## 2024-03-04 PROCEDURE — 82248 BILIRUBIN DIRECT: CPT

## 2024-03-04 PROCEDURE — 6360000004 HC RX CONTRAST MEDICATION: Performed by: STUDENT IN AN ORGANIZED HEALTH CARE EDUCATION/TRAINING PROGRAM

## 2024-03-04 PROCEDURE — 71046 X-RAY EXAM CHEST 2 VIEWS: CPT

## 2024-03-04 RX ORDER — 0.9 % SODIUM CHLORIDE 0.9 %
1000 INTRAVENOUS SOLUTION INTRAVENOUS ONCE
Status: COMPLETED | OUTPATIENT
Start: 2024-03-04 | End: 2024-03-04

## 2024-03-04 RX ORDER — ONDANSETRON 2 MG/ML
4 INJECTION INTRAMUSCULAR; INTRAVENOUS ONCE
Status: COMPLETED | OUTPATIENT
Start: 2024-03-04 | End: 2024-03-04

## 2024-03-04 RX ADMIN — ONDANSETRON 4 MG: 2 INJECTION INTRAMUSCULAR; INTRAVENOUS at 08:43

## 2024-03-04 RX ADMIN — IOPAMIDOL 80 ML: 755 INJECTION, SOLUTION INTRAVENOUS at 08:50

## 2024-03-04 RX ADMIN — SODIUM CHLORIDE 1000 ML: 9 INJECTION, SOLUTION INTRAVENOUS at 08:43

## 2024-03-04 ASSESSMENT — PAIN DESCRIPTION - ORIENTATION: ORIENTATION: LEFT

## 2024-03-04 ASSESSMENT — PAIN - FUNCTIONAL ASSESSMENT
PAIN_FUNCTIONAL_ASSESSMENT: NONE - DENIES PAIN
PAIN_FUNCTIONAL_ASSESSMENT: 0-10

## 2024-03-04 ASSESSMENT — PAIN SCALES - GENERAL: PAINLEVEL_OUTOF10: 7

## 2024-03-04 NOTE — ED PROVIDER NOTES

## 2024-03-04 NOTE — DISCHARGE INSTRUCTIONS
You were seen today for multiple symptoms.  Your lab work and imaging is reassuring that there are no acute problems occurring at this time requiring additional workup or admission.    We recommend you follow-up with your primary care physician Dr. Ortiz later this week.    If symptoms are worse or other new concerns please come back to the Emergency Department for re-evaluation.

## 2024-03-04 NOTE — ED NOTES
Staff assisted pt to the restroom and did not get urine sample. RN will rertry later. IV infusing.

## 2024-03-04 NOTE — ED TRIAGE NOTES
Pt presents to the ED from work with complaints of chest pain, left sided pain, jaw pain, nausea, and dizziness. Pt reports jaw pain began on Thursday and that been progressively getting worse. Pt respirations are even and unlabored.

## 2024-03-06 LAB
EKG ATRIAL RATE: 68 BPM
EKG P AXIS: 71 DEGREES
EKG P-R INTERVAL: 146 MS
EKG Q-T INTERVAL: 384 MS
EKG QRS DURATION: 70 MS
EKG QTC CALCULATION (BAZETT): 408 MS
EKG R AXIS: 78 DEGREES
EKG T AXIS: 64 DEGREES
EKG VENTRICULAR RATE: 68 BPM

## 2024-03-07 ENCOUNTER — OFFICE VISIT (OUTPATIENT)
Dept: FAMILY MEDICINE CLINIC | Age: 59
End: 2024-03-07
Payer: COMMERCIAL

## 2024-03-07 ENCOUNTER — NURSE ONLY (OUTPATIENT)
Dept: LAB | Age: 59
End: 2024-03-07

## 2024-03-07 VITALS
HEART RATE: 51 BPM | DIASTOLIC BLOOD PRESSURE: 70 MMHG | HEIGHT: 60 IN | SYSTOLIC BLOOD PRESSURE: 108 MMHG | TEMPERATURE: 98.3 F | BODY MASS INDEX: 24.39 KG/M2 | RESPIRATION RATE: 16 BRPM | OXYGEN SATURATION: 98 % | WEIGHT: 124.2 LBS

## 2024-03-07 DIAGNOSIS — H02.402 PTOSIS OF EYELID, LEFT: ICD-10-CM

## 2024-03-07 DIAGNOSIS — R53.83 OTHER FATIGUE: Primary | ICD-10-CM

## 2024-03-07 DIAGNOSIS — R53.83 OTHER FATIGUE: ICD-10-CM

## 2024-03-07 DIAGNOSIS — R53.1 LEFT-SIDED WEAKNESS: ICD-10-CM

## 2024-03-07 DIAGNOSIS — R06.02 SHORTNESS OF BREATH: ICD-10-CM

## 2024-03-07 DIAGNOSIS — G24.9 DYSTONIA: ICD-10-CM

## 2024-03-07 LAB
CRP SERPL-MCNC: < 0.3 MG/DL (ref 0–1)
D DIMER PPP IA.FEU-MCNC: 238 NG/ML FEU (ref 0–500)
ERYTHROCYTE [SEDIMENTATION RATE] IN BLOOD BY WESTERGREN METHOD: 10 MM/HR (ref 0–20)
FOLATE SERPL-MCNC: 9.5 NG/ML (ref 4.8–24.2)
PHOSPHATE SERPL-MCNC: 3.8 MG/DL (ref 2.4–4.7)
T4 FREE SERPL-MCNC: 1.11 NG/DL (ref 0.93–1.68)
TSH SERPL DL<=0.005 MIU/L-ACNC: 3.45 UIU/ML (ref 0.4–4.2)
VIT B12 SERPL-MCNC: 352 PG/ML (ref 211–911)

## 2024-03-07 PROCEDURE — 99214 OFFICE O/P EST MOD 30 MIN: CPT | Performed by: STUDENT IN AN ORGANIZED HEALTH CARE EDUCATION/TRAINING PROGRAM

## 2024-03-07 RX ORDER — POTASSIUM CHLORIDE 1.5 G/1.58G
20 POWDER, FOR SOLUTION ORAL 2 TIMES DAILY
COMMUNITY

## 2024-03-07 ASSESSMENT — PATIENT HEALTH QUESTIONNAIRE - PHQ9
7. TROUBLE CONCENTRATING ON THINGS, SUCH AS READING THE NEWSPAPER OR WATCHING TELEVISION: 0
2. FEELING DOWN, DEPRESSED OR HOPELESS: 0
4. FEELING TIRED OR HAVING LITTLE ENERGY: SEVERAL DAYS
SUM OF ALL RESPONSES TO PHQ QUESTIONS 1-9: 1
6. FEELING BAD ABOUT YOURSELF - OR THAT YOU ARE A FAILURE OR HAVE LET YOURSELF OR YOUR FAMILY DOWN: 0
5. POOR APPETITE OR OVEREATING: NOT AT ALL
1. LITTLE INTEREST OR PLEASURE IN DOING THINGS: 0
3. TROUBLE FALLING OR STAYING ASLEEP: 0
8. MOVING OR SPEAKING SO SLOWLY THAT OTHER PEOPLE COULD HAVE NOTICED. OR THE OPPOSITE - BEING SO FIDGETY OR RESTLESS THAT YOU HAVE BEEN MOVING AROUND A LOT MORE THAN USUAL: NOT AT ALL
SUM OF ALL RESPONSES TO PHQ QUESTIONS 1-9: 1
8. MOVING OR SPEAKING SO SLOWLY THAT OTHER PEOPLE COULD HAVE NOTICED. OR THE OPPOSITE, BEING SO FIGETY OR RESTLESS THAT YOU HAVE BEEN MOVING AROUND A LOT MORE THAN USUAL: 0
10. IF YOU CHECKED OFF ANY PROBLEMS, HOW DIFFICULT HAVE THESE PROBLEMS MADE IT FOR YOU TO DO YOUR WORK, TAKE CARE OF THINGS AT HOME, OR GET ALONG WITH OTHER PEOPLE: NOT DIFFICULT AT ALL
SUM OF ALL RESPONSES TO PHQ QUESTIONS 1-9: 1
2. FEELING DOWN, DEPRESSED OR HOPELESS: NOT AT ALL
SUM OF ALL RESPONSES TO PHQ9 QUESTIONS 1 & 2: 0
SUM OF ALL RESPONSES TO PHQ QUESTIONS 1-9: 1
10. IF YOU CHECKED OFF ANY PROBLEMS, HOW DIFFICULT HAVE THESE PROBLEMS MADE IT FOR YOU TO DO YOUR WORK, TAKE CARE OF THINGS AT HOME, OR GET ALONG WITH OTHER PEOPLE: 0
6. FEELING BAD ABOUT YOURSELF - OR THAT YOU ARE A FAILURE OR HAVE LET YOURSELF OR YOUR FAMILY DOWN: NOT AT ALL
9. THOUGHTS THAT YOU WOULD BE BETTER OFF DEAD, OR OF HURTING YOURSELF: NOT AT ALL
1. LITTLE INTEREST OR PLEASURE IN DOING THINGS: NOT AT ALL
4. FEELING TIRED OR HAVING LITTLE ENERGY: 1
SUM OF ALL RESPONSES TO PHQ QUESTIONS 1-9: 1
7. TROUBLE CONCENTRATING ON THINGS, SUCH AS READING THE NEWSPAPER OR WATCHING TELEVISION: NOT AT ALL
9. THOUGHTS THAT YOU WOULD BE BETTER OFF DEAD, OR OF HURTING YOURSELF: 0
3. TROUBLE FALLING OR STAYING ASLEEP: NOT AT ALL
5. POOR APPETITE OR OVEREATING: 0

## 2024-03-07 ASSESSMENT — ENCOUNTER SYMPTOMS
EYE PAIN: 0
EYE ITCHING: 0
SINUS PAIN: 0
COUGH: 0
EYE REDNESS: 0
ABDOMINAL PAIN: 0
SHORTNESS OF BREATH: 1
EYE DISCHARGE: 0

## 2024-03-07 NOTE — PROGRESS NOTES
[facility] PROFESSIONAL SERVS  [facility]  [address]  [facility] [address]  Dept: [phone]  Dept Fax: [phone]  Loc: [phone]  Resident Note    Assessment & Plan:    1. Other fatigue    2. Ptosis of eyelid, left    3. Shortness of breath    4. Dystonia    5. Left-sided weakness       Orders Placed This Encounter    MRI BRAIN W CONTRAST     Standing Status:   Future     Standing Expiration Date:   3/7/2025     Order Specific Question:   STAT Creatinine as needed:     Answer:   No     Order Specific Question:   Reason for exam:     Answer:   1 month of unilateral ptosis, left arm and leg weakness, and progressive shortness of breath. CTA head/neck and ct head negative recently.     Order Specific Question:   What is the sedation requirement?     Answer:   None    TSH     Standing Status:   Future     Number of Occurrences:   1     Standing Expiration Date:   3/7/2025    T4, Free     Standing Status:   Future     Number of Occurrences:   1     Standing Expiration Date:   3/7/2025    Phosphorus     Standing Status:   Future     Number of Occurrences:   1     Standing Expiration Date:   3/7/2025    Acetylcholine Receptor     Standing Status:   Future     Number of Occurrences:   1     Standing Expiration Date:   3/7/2025    D-Dimer, Quantitative     Standing Status:   Future     Number of Occurrences:   1     Standing Expiration Date:   3/7/2025    Sedimentation Rate     Standing Status:   Future     Number of Occurrences:   1     Standing Expiration Date:   3/7/2025    C-Reactive Protein     Standing Status:   Future     Number of Occurrences:   1     Standing Expiration Date:   3/7/2025    Vitamin B12 & Folate     Standing Status:   Future     Number of Occurrences:   1     Standing Expiration Date:   3/7/2025    [facility]     Referral Priority:   Routine     Referral Type:   Eval and Treat     Referral Reason:   Specialty 
S: 58 y.o. female with   Chief Complaint   Patient presents with    Numbness     In both arms, in the ;last 3 weeks, cramping in legs and feet. Headaches just behind left eye.   Discuss neuro referral.  Dry needling  on her neck and back and scalp helped with her eye redness and pain        HPI: please see resident note for HPI and ROS.  59 yo female here for ED follow up. Report 3 weeks shortness of breath non responsive to albuterol that worsens throughout the day. Also describesL unilateral ptosis in the morning. Left sided muscle weakness x 1 month. Tingling in all limbs. Went to ED 3/4 and CTA head/neck negative for stroke.     BP Readings from Last 3 Encounters:   03/07/24 108/70   03/04/24 112/63   12/07/23 116/68     Wt Readings from Last 3 Encounters:   03/07/24 56.3 kg (124 lb 3.2 oz)   03/04/24 54 kg (119 lb)   12/07/23 53.6 kg (118 lb 2.7 oz)       O: VS:  height is 1.524 m (5') and weight is 56.3 kg (124 lb 3.2 oz). Her oral temperature is 98.3 °F (36.8 °C). Her blood pressure is 108/70 and her pulse is 51. Her respiration is 16 and oxygen saturation is 98%.        Diagnosis Orders   1. Other fatigue  TSH    T4, Free    Phosphorus    Sedimentation Rate    C-Reactive Protein      2. Ptosis of eyelid, left  Acetylcholine Receptor      3. Shortness of breath  D-Dimer, Quantitative    Sedimentation Rate    C-Reactive Protein      4. Dystonia  [facility]          Plan:  Please refer to resident note for full plan.  Check MRI brain  Physical therapy  Check D dimer  Check phosphate, TSH, inflammatory markers and Ach receptor antibodies  Stop niacin as potential cause       Return in about 2 weeks (around 3/21/2024) for follow up of symptoms.    Health Maintenance Due   Topic Date Due    Hepatitis B vaccine (1 of 3 - 3-dose series) Never done    HIV screen  Never done    Hepatitis C screen  Never done    Colorectal Cancer Screen  Never done    Shingles vaccine (2 of 3) 03/10/2017    
week for recheck or sooner if needed.     Health Maintenance Due   Topic Date Due    Hepatitis B vaccine (1 of 3 - 3-dose series) Never done    HIV screen  Never done    Hepatitis C screen  Never done    Colorectal Cancer Screen  Never done    Shingles vaccine (2 of 3) 03/10/2017    Pneumococcal 0-64 years Vaccine (2 - PCV) 04/19/2019    Breast cancer screen  07/27/2023    Flu vaccine (1) 08/01/2023    COVID-19 Vaccine (4 - 2023-24 season) 09/01/2023       Attending Physician Statement  I have discussed the case, including pertinent history and exam findings with the resident. I also have seen the patient and performed key portions of the examination.  I agree with the documented assessment and plan as documented by the resident.        Lenore Tafoya, DO 3/9/2024 12:48 PM

## 2024-03-10 LAB — ACHR BIND AB SER-SCNC: NORMAL NMOL/L

## 2024-03-11 ENCOUNTER — TELEPHONE (OUTPATIENT)
Dept: FAMILY MEDICINE CLINIC | Age: 59
End: 2024-03-11

## 2024-03-11 NOTE — TELEPHONE ENCOUNTER
----- Message from Aparna Wylie sent at 3/11/2024 12:09 PM EDT -----  Subject: Message to Provider    QUESTIONS  Information for Provider? pt needs order for mri to say w/ and w/ out   contrast. the current order needs to be fixed by pcp.   ---------------------------------------------------------------------------  --------------  CALL BACK INFO  [phone]; OK to leave message on voicemail  ---------------------------------------------------------------------------  --------------  SCRIPT ANSWERS  Relationship to Patient? Self

## 2024-03-11 NOTE — TELEPHONE ENCOUNTER
Patient informed, verbally understood. Patient is going to call and schedule MRI. Patient wants to know if she is able to start back taking Niacin.

## 2024-03-11 NOTE — TELEPHONE ENCOUNTER
Radiology called and stated they received her referral for her MRI but it needs to be put in as With or Without contrast.  Please advise.

## 2024-03-11 NOTE — TELEPHONE ENCOUNTER
----- Message from Dana Ortiz MD sent at 3/10/2024  7:36 PM EDT -----  Labs are normal. Will await MRI. If symptoms not improving, please let us know.

## 2024-03-12 NOTE — TELEPHONE ENCOUNTER
Pt called back today.  Left a message on Dr. Sanders line.  Said I can call and leave a message on her phone.    I called and left a vm regarding the MRI has been fixed by Dr. Ortiz already.  Also to answer you question regarding the Niacin.  Said she should continue to hold off for this month and see if symptoms improve.  Are any improving?  If not call and let us know.    Also suggested an easier way to communicate may be to send Dr. Ortiz a message instead of the phone system.

## 2024-03-21 ENCOUNTER — PATIENT MESSAGE (OUTPATIENT)
Dept: FAMILY MEDICINE CLINIC | Age: 59
End: 2024-03-21

## 2024-03-21 DIAGNOSIS — K90.9 STEATORRHEA: Primary | ICD-10-CM

## 2024-03-26 ENCOUNTER — HOSPITAL ENCOUNTER (OUTPATIENT)
Age: 59
Discharge: HOME | End: 2024-03-26
Payer: COMMERCIAL

## 2024-03-26 ENCOUNTER — HOSPITAL ENCOUNTER (OUTPATIENT)
Dept: PHYSICAL THERAPY | Age: 59
Setting detail: THERAPIES SERIES
Discharge: HOME | End: 2024-03-26
Payer: COMMERCIAL

## 2024-03-26 DIAGNOSIS — K90.9 STEATORRHEA: ICD-10-CM

## 2024-03-26 LAB — IGA SERPL-MCNC: 282 MG/DL (ref 70–400)

## 2024-03-26 PROCEDURE — 97140 MANUAL THERAPY 1/> REGIONS: CPT

## 2024-03-26 PROCEDURE — 36415 COLL VENOUS BLD VENIPUNCTURE: CPT

## 2024-03-26 PROCEDURE — 82784 ASSAY IGA/IGD/IGG/IGM EACH: CPT

## 2024-03-26 PROCEDURE — 97162 PT EVAL MOD COMPLEX 30 MIN: CPT

## 2024-03-26 PROCEDURE — 97110 THERAPEUTIC EXERCISES: CPT

## 2024-03-26 PROCEDURE — 83516 IMMUNOASSAY NONANTIBODY: CPT

## 2024-03-26 NOTE — PROGRESS NOTES
** PLEASE SIGN, DATE AND TIME CERTIFICATION BELOW AND RETURN TO [facility] OUTPATIENT REHABILITATION (FAX #: [phone]).  ATTEST/CO-SIGN IF ACCESSING VIA [employer]GridET.  THANK YOU.**    I certify that I have examined the patient below and determined that Physical Medicine and Rehabilitation service is necessary and that I approve the established plan of care for up to 90 days or as specifically noted.  Attestation, signature or co-signature of physician indicates approval of certification requirements.    ________________________ ____________ __________  Physician Signature   Date   Time    [facility]  PHYSICAL THERAPY  [x] EVALUATION  [] DAILY NOTE (LAND) [] DAILY NOTE (AQUATIC ) [] PROGRESS NOTE [] DISCHARGE NOTE    [x] OUTPATIENT REHABILITATION [facility]   [] [facility] [place]    [] [facility]   [] [facility]    Date: 3/26/2024  Patient Name:  Shila Camara  : 1965  MRN: [id number]  CSN: [id number]    Referring Practitioner Dana Ortiz MD    Diagnosis Dystonia, unspecified    Treatment Diagnosis M25.512  Left Shoulder Pain  M25.612 Stiffness of Left Shoulder  R20.9 Unspecified Disturbance of Skin Sensation, M54.2, G89.29  Chronic Neck Pain  M54.6,G89.29  Chronic Thoracic Back Pain  R29.3 Abnormal Posture  M25.60 Stiffness of Unspecified Joint, and R42   Dizziness and giddiness   Date of Evaluation 3/26/24    Additional Pertinent History Dystonia, essential tremor - head shaking \"no\" - treated with botox injections in the neck to release R side for neck sidebend, takes Propranolol beta blocker for hand tremors - but this causes side effects trouble breathing. Takes primidone (benzodiazapene) and niacin supplement.       Functional Outcome Measure Used Neck Disability Index    Functional Outcome Score 950 (3/26/24)       Insurance: Primary: Payor: UMR /  /  / ,   Secondary:    Authorization Information: 15% copay after deductible. 30 visit limit

## 2024-03-27 ENCOUNTER — HOSPITAL ENCOUNTER (OUTPATIENT)
Dept: MRI IMAGING | Age: 59
Discharge: HOME | End: 2024-03-27
Payer: COMMERCIAL

## 2024-03-27 DIAGNOSIS — R06.02 SHORTNESS OF BREATH: ICD-10-CM

## 2024-03-27 DIAGNOSIS — R53.1 LEFT-SIDED WEAKNESS: ICD-10-CM

## 2024-03-27 DIAGNOSIS — H02.402 PTOSIS OF EYELID, LEFT: ICD-10-CM

## 2024-03-27 DIAGNOSIS — R53.83 OTHER FATIGUE: ICD-10-CM

## 2024-03-27 PROCEDURE — A9579 GAD-BASE MR CONTRAST NOS,1ML: HCPCS | Performed by: STUDENT IN AN ORGANIZED HEALTH CARE EDUCATION/TRAINING PROGRAM

## 2024-03-27 PROCEDURE — 70553 MRI BRAIN STEM W/O & W/DYE: CPT

## 2024-03-27 PROCEDURE — 6360000004 HC RX CONTRAST MEDICATION: Performed by: STUDENT IN AN ORGANIZED HEALTH CARE EDUCATION/TRAINING PROGRAM

## 2024-03-27 RX ADMIN — GADOTERIDOL 10 ML: 279.3 INJECTION, SOLUTION INTRAVENOUS at 06:38

## 2024-03-28 LAB — TTG IGA SER IA-ACNC: 0.4 U/ML

## 2024-04-16 ENCOUNTER — HOSPITAL ENCOUNTER (OUTPATIENT)
Dept: PHYSICAL THERAPY | Age: 59
Setting detail: THERAPIES SERIES
Discharge: HOME | End: 2024-04-16

## 2024-04-16 NOTE — DISCHARGE SUMMARY
[facility]  PHYSICAL THERAPY QUICK DISCHARGE NOTE  OUTPATIENT  [facility] [place]    Patient Name: Shila Camara        CSN: [id number]   YOB: 1965  Gender: female  Dana Ortiz MD,    Dystonia, unspecified [G24.9] ,      Patient is discharged from Physical Therapy services at this time.  See last note for details related to results of therapy and goal achievement.    Reason for discharge: Attendance. Patient attended PT [facility] on 3/26/24 and received dry needling treatment for the neck and occiput, facial areas of pain. Afterwards she reported really good relief for a few weeks, and has cancelled 2-3 subsequent appointments to avoid the financial strain. At this point patient is requesting to discharge from PT and will reach out to her doctor if future therapy is needed.       Asha Burrows, PT, DPT

## 2024-06-13 ENCOUNTER — HOSPITAL ENCOUNTER (EMERGENCY)
Age: 59
Discharge: HOME | End: 2024-06-13
Payer: COMMERCIAL

## 2024-06-13 VITALS
SYSTOLIC BLOOD PRESSURE: 130 MMHG | HEART RATE: 90 BPM | RESPIRATION RATE: 20 BRPM | TEMPERATURE: 97 F | OXYGEN SATURATION: 99 % | DIASTOLIC BLOOD PRESSURE: 82 MMHG

## 2024-06-13 DIAGNOSIS — J01.40 ACUTE NON-RECURRENT PANSINUSITIS: Primary | ICD-10-CM

## 2024-06-13 LAB — SARS-COV-2 RDRP RESP QL NAA+PROBE: NOT  DETECTED

## 2024-06-13 PROCEDURE — 87635 SARS-COV-2 COVID-19 AMP PRB: CPT

## 2024-06-13 PROCEDURE — 99213 OFFICE O/P EST LOW 20 MIN: CPT

## 2024-06-13 RX ORDER — AMOXICILLIN AND CLAVULANATE POTASSIUM 875; 125 MG/1; MG/1
1 TABLET, FILM COATED ORAL 2 TIMES DAILY
Qty: 14 TABLET | Refills: 0 | Status: SHIPPED | OUTPATIENT
Start: 2024-06-13 | End: 2024-06-20

## 2024-06-13 ASSESSMENT — ENCOUNTER SYMPTOMS
SINUS PAIN: 1
DIARRHEA: 0
NAUSEA: 0
SORE THROAT: 0
COUGH: 0
RHINORRHEA: 1
EYE REDNESS: 0
SHORTNESS OF BREATH: 0
VOMITING: 0
EYE DISCHARGE: 0
SINUS PRESSURE: 1
TROUBLE SWALLOWING: 0

## 2024-06-13 NOTE — DISCHARGE INSTRUCTIONS
COVID Negative.     Prescription for Augmentin.  Use Zyrtec, Flonase, and Mucinex D (If they don't aggravate your tremor).   Drink plenty of fluids to thin mucus.  Sleep with your head propped up on a pillow.  Inhale steam three of four times daily( exp: sit in bathroom with hot shower running.)  Avoid Trigger and not smoking.  May also clean nasal passages with salt water to ease congestion. Use over-the-counter Tylenol and Motrin for pain or fever.  Follow-up with their PCP in 3 to 5 days and worsening symptoms.

## 2024-06-13 NOTE — ED PROVIDER NOTES
alcohol per week. She reports that she does not use drugs.    PHYSICAL EXAM     ED TRIAGE VITALS  BP: 130/82, Temp: 97 °F (36.1 °C), Pulse: 90, Respirations: 20, SpO2: 99 %  Physical Exam  Vitals and nursing note reviewed.   Constitutional:       General: She is not in acute distress.     Appearance: Normal appearance. She is well-developed. She is not ill-appearing, toxic-appearing or diaphoretic.   HENT:      Head: Normocephalic and atraumatic.      Right Ear: Hearing normal. No mastoid tenderness. No hemotympanum. Tympanic membrane is not perforated, erythematous or bulging.      Left Ear: Hearing normal. No mastoid tenderness. No hemotympanum. Tympanic membrane is not perforated, erythematous or bulging.      Nose: Congestion and rhinorrhea present.      Right Turbinates: Swollen.      Left Turbinates: Swollen.      Right Sinus: Maxillary sinus tenderness and frontal sinus tenderness present.      Left Sinus: Maxillary sinus tenderness and frontal sinus tenderness present.      Mouth/Throat:      Mouth: Mucous membranes are moist.      Pharynx: Oropharynx is clear. Uvula midline. Posterior oropharyngeal erythema present.      Tonsils: No tonsillar abscesses.   Eyes:      Conjunctiva/sclera:      Right eye: Right conjunctiva is not injected. No hemorrhage.     Left eye: Left conjunctiva is not injected. No hemorrhage.     Pupils: Pupils are equal, round, and reactive to light.   Neck:      Thyroid: No thyromegaly.      Trachea: Trachea normal.   Cardiovascular:      Rate and Rhythm: Normal rate and regular rhythm. No extrasystoles are present.     Chest Wall: PMI is not displaced.      Heart sounds: Normal heart sounds. No murmur heard.     No friction rub. No gallop.   Pulmonary:      Effort: Pulmonary effort is normal. No respiratory distress.      Breath sounds: Normal breath sounds.   Lymphadenopathy:      Head:      Right side of head: No submental, submandibular, tonsillar or occipital adenopathy.      Left  side of head: No submental, submandibular, tonsillar or occipital adenopathy.      Upper Body:      Right upper body: No supraclavicular adenopathy.      Left upper body: No supraclavicular adenopathy.   Skin:     General: Skin is warm and dry.      Capillary Refill: Capillary refill takes less than 2 seconds.      Coloration: Skin is not pale.      Findings: No rash.   Neurological:      General: No focal deficit present.      Mental Status: She is alert and oriented to person, place, and time. She is not disoriented.   Psychiatric:         Mood and Affect: Mood normal.         Behavior: Behavior is cooperative.         DIAGNOSTIC RESULTS   Labs:  Results for orders placed or performed during the hospital encounter of 06/13/24   COVID-19, Rapid    Specimen: Nasopharyngeal Swab   Result Value Ref Range    SARS-CoV-2, KEL NOT  DETECTED NOT DETECTED       IMAGING:  No orders to display      URGENT CARE COURSE:     Vitals:    06/13/24 1542   BP: 130/82   Pulse: 90   Resp: 20   Temp: 97 °F (36.1 °C)   SpO2: 99%       Medications - No data to display  PROCEDURES:  None  FINAL IMPRESSION      1. Acute non-recurrent pansinusitis        DISPOSITION/PLAN   DISPOSITION Decision To Discharge 06/13/2024 03:51:22 PM      Patient seen and evaluated for sinus pressure.  Assessment consistent with Acute Pansinusitis.  Patient is provided a prescription for Augmentin.  Instructed to use Zyrtec, Flonase, and Mucinex D.   Drink plenty of fluids to thin mucus.  Sleep with your head propped up on a pillow.  Inhale steam three of four times daily( exp: sit in bathroom with hot shower running.)  Avoid Trigger and not smoking.  May also clean nasal passages with salt water to ease congestion. The Patient is instructed to use over-the-counter Tylenol and Motrin for pain or fever.  Instructed to follow-up with their PCP or [facility] in 3 to 5 days and worsening symptoms.  The patient is agreeable with the above plan

## 2024-07-11 ENCOUNTER — OFFICE VISIT (OUTPATIENT)
Dept: PHYSICAL MEDICINE AND REHAB | Age: 59
End: 2024-07-11
Payer: COMMERCIAL

## 2024-07-11 VITALS
DIASTOLIC BLOOD PRESSURE: 86 MMHG | SYSTOLIC BLOOD PRESSURE: 118 MMHG | BODY MASS INDEX: 24.35 KG/M2 | HEIGHT: 60 IN | WEIGHT: 124 LBS

## 2024-07-11 DIAGNOSIS — G25.0 ESSENTIAL TREMOR: ICD-10-CM

## 2024-07-11 DIAGNOSIS — G89.29 OTHER CHRONIC PAIN: ICD-10-CM

## 2024-07-11 DIAGNOSIS — M47.812 CERVICAL SPONDYLOSIS: ICD-10-CM

## 2024-07-11 DIAGNOSIS — G24.3 CERVICAL DYSTONIA: Primary | ICD-10-CM

## 2024-07-11 PROCEDURE — 99214 OFFICE O/P EST MOD 30 MIN: CPT | Performed by: NURSE PRACTITIONER

## 2024-07-11 NOTE — PROGRESS NOTES
Functionality Assessment/Goals Worksheet     On a scale of 0 (Does not Interfere) to 10 (Completely Interferes)     1.  Which number describes how during the past week pain has interfered with           the following:  A.  General Activity:  4  B.  Mood: 5  C.  Walking Ability:  0  D.  Normal Work (Includes both work outside the home and housework):  7  E.  Relations with Other People:   5  F.  Sleep:   6  G.  Enjoyment of Life:   7    2.  Patient Prefers to Take their Pain Medications:     []  On a regular basis   []  Only when necessary    [x]  Does not take pain medications    3.  What are the Patient's Goals/Expectations for Visiting Pain Management?     []  Learn about my pain    []  Receive Medication   []  Physical Therapy     []  Treat Depression   [x]  Receive Injections    []  Treat Sleep   []  Deal with Anxiety and Stress   []  Treat Opoid Dependence/Addiction   []  Other:                                
DAILY    montelukast (SINGULAIR) 10 mg, Oral, DAILY    primidone (MYSOLINE) 50 mg, Oral, 3 TIMES DAILY    propranolol (INDERAL) 10 mg, Oral, 3 TIMES DAILY    RED YEAST RICE EXTRACT PO Oral, DAILY    venlafaxine (EFFEXOR XR) 37.5 mg, Oral, DAILY    vitamin D (CHOLECALCIFEROL) 25 MCG (1000 UT) TABS tablet Oral, DAILY    vitamin E 400 Units, Oral, DAILY       Allergies   Allergen Reactions    Keflex [Cephalexin]        Review of Systems:   Constitutional: negative for weight changes or fevers  Genitourinary: negative for bowel/bladder incontinence   Musculoskeletal: positive for neck pain and headaches  Neurological: negative for any arm weakness or numbness/tingling  Behavioral/Psych: negative for anxiety/depression   All other systems reviewed and are negative    Objective:     Vitals:    07/11/24 1529   BP: 118/86     Constitutional: Pleasant, no acute distress   Head: Normocephalic, atraumatic   Eyes: Conjunctivae normal   Neck: Supple, symmetrical   Respiration: Non-labored breathing   Cardiovascular: Limbs warm and well perfused   Musculoskeletal: Right lateral torticollis appreciated with appreciable tremor. Cervical ROM reduced in left lateral rotation. Negative Spurling maneuver bilaterally. Increased pain with facet loading.  Tenderness palpation over bilateral trapezius and cervical paraspinal muscles.  Neuro: Alert, oriented. CN II-XII appear grossly intact. Motor strength 5/5 SAb, EF, EE, WE, Luis. LT sensation intact in upper limbs. Biceps/triceps reflexes 2+ and symmetrical. Negative Martinez bilaterally.   Skin: no skin rashes or lesions noted   Psychological: Cooperative, no exaggerated pain behaviors       Assessment:    Diagnosis Orders   1. Cervical dystonia        2. Essential tremor        3. Cervical spondylosis        4. Other chronic pain              [place] Hoa is a 59 y.o.female presenting to the pain clinic for evaluation of cervical dystonia and essential tremors.  Patient has

## 2024-07-23 DIAGNOSIS — F33.0 MAJOR DEPRESSIVE DISORDER, RECURRENT EPISODE, MILD (HCC): ICD-10-CM

## 2024-07-23 DIAGNOSIS — G25.0 ESSENTIAL TREMOR: Primary | ICD-10-CM

## 2024-07-23 DIAGNOSIS — J45.909 UNCOMPLICATED ASTHMA, UNSPECIFIED ASTHMA SEVERITY, UNSPECIFIED WHETHER PERSISTENT: ICD-10-CM

## 2024-07-23 RX ORDER — PROPRANOLOL HYDROCHLORIDE 10 MG/1
10 TABLET ORAL 3 TIMES DAILY
Qty: 270 TABLET | Refills: 2 | Status: SHIPPED | OUTPATIENT
Start: 2024-07-23

## 2024-07-23 RX ORDER — ALBUTEROL SULFATE 90 UG/1
2 AEROSOL, METERED RESPIRATORY (INHALATION) 4 TIMES DAILY PRN
Qty: 18 G | Refills: 5 | Status: SHIPPED | OUTPATIENT
Start: 2024-07-23

## 2024-07-23 RX ORDER — VENLAFAXINE HYDROCHLORIDE 37.5 MG/1
37.5 CAPSULE, EXTENDED RELEASE ORAL DAILY
Qty: 90 CAPSULE | Refills: 2 | Status: SHIPPED | OUTPATIENT
Start: 2024-07-23

## 2024-07-23 RX ORDER — PRIMIDONE 50 MG/1
50 TABLET ORAL 3 TIMES DAILY
Qty: 270 TABLET | Refills: 2 | Status: SHIPPED | OUTPATIENT
Start: 2024-07-23

## 2024-07-23 NOTE — TELEPHONE ENCOUNTER
Patient's last appointment was : 3/7/2024 with our    Patient's next appointment is : Visit date not found with  Last refilled on: 10-11-23  Which pharmacy does the script need sent to: [facility]      No results found for: \"LABA1C\"  Lab Results   Component Value Date    HDL 72 02/28/2024     Lab Results   Component Value Date     03/04/2024    K 4.3 03/04/2024     03/04/2024    CO2 25 03/04/2024    BUN 11 03/04/2024    CREATININE 0.5 03/04/2024    GLUCOSE 90 03/04/2024    CALCIUM 9.3 03/04/2024    BILITOT 0.3 03/04/2024    ALKPHOS 101 03/04/2024    AST 27 03/04/2024    ALT 44 03/04/2024    LABGLOM >60 03/04/2024     Lab Results   Component Value Date    TSH 3.450 03/07/2024    T4FREE 1.11 03/07/2024     Lab Results   Component Value Date    WBC 5.6 03/04/2024    HGB 14.1 03/04/2024    HCT 42.3 03/04/2024    MCV 92.0 03/04/2024     03/04/2024

## 2024-08-23 ENCOUNTER — OFFICE VISIT (OUTPATIENT)
Dept: PHYSICAL MEDICINE AND REHAB | Age: 59
End: 2024-08-23

## 2024-08-23 VITALS
BODY MASS INDEX: 24.35 KG/M2 | HEIGHT: 60 IN | DIASTOLIC BLOOD PRESSURE: 82 MMHG | WEIGHT: 124 LBS | SYSTOLIC BLOOD PRESSURE: 106 MMHG

## 2024-08-23 DIAGNOSIS — G24.3 CERVICAL DYSTONIA: Primary | ICD-10-CM

## 2024-08-23 DIAGNOSIS — G89.29 OTHER CHRONIC PAIN: ICD-10-CM

## 2024-08-23 DIAGNOSIS — G25.0 ESSENTIAL TREMOR: ICD-10-CM

## 2024-08-23 DIAGNOSIS — M47.812 CERVICAL SPONDYLOSIS: ICD-10-CM

## 2024-08-23 NOTE — PROGRESS NOTES
Pre-operative Diagnosis: Cervical Dystonia     Post-operative Diagnosis: Cervical Dystonia     Procedure: Botox for Cervical Dystonia     Procedure Description:  Patient was seated on the examination table. Botox was drawn up into a tuberculin syringes, to a concentration of 5 units per 0.1 mL with a 30-gauge needle. Skin was prepped with alcohol wipes. The following muscles were injected after negative aspiration; left trapezius, left rhomboids, left cervical paraspinal muscles, and left occipitalis muscle for a total of 200 units. The patient tolerated the procedure well.    Medications: 4 mL in 200 U Botox drawn up in 4 separate 1 mL TB syringes = 5 U per 0.1 mL syringe    Amount medication wasted: 0 units        Procedural Complications: None        Adam Julian DO  Interventional Pain Management/PM&R   [facility] [place]     
Patient given instructions on gentle neck ROM/stretching     Referrals:  None    Prescriptions Written This Visit:   None    Follow-up: 12 weeks    I spent 20 minutes directly involved in patient care and greater than 50% of this time was spent discussing her clinical diagnosis of cervical dystonia, discussing stretching and range of motion exercise techniques she should be doing daily to help out with her neck range of motion which extended beyond the preservice time associated with the procedure in office.      Adam Julian DO  Interventional Pain Management/PM&R   [facility] [place]

## 2024-11-15 ENCOUNTER — OFFICE VISIT (OUTPATIENT)
Dept: PHYSICAL MEDICINE AND REHAB | Age: 59
End: 2024-11-15
Payer: COMMERCIAL

## 2024-11-15 VITALS
BODY MASS INDEX: 24.35 KG/M2 | WEIGHT: 124 LBS | HEIGHT: 60 IN | SYSTOLIC BLOOD PRESSURE: 96 MMHG | DIASTOLIC BLOOD PRESSURE: 72 MMHG

## 2024-11-15 DIAGNOSIS — G25.0 ESSENTIAL TREMOR: ICD-10-CM

## 2024-11-15 DIAGNOSIS — M47.812 CERVICAL SPONDYLOSIS: ICD-10-CM

## 2024-11-15 DIAGNOSIS — G89.29 OTHER CHRONIC PAIN: ICD-10-CM

## 2024-11-15 DIAGNOSIS — G24.3 CERVICAL DYSTONIA: Primary | ICD-10-CM

## 2024-11-15 PROCEDURE — 99213 OFFICE O/P EST LOW 20 MIN: CPT | Performed by: ANESTHESIOLOGY

## 2024-11-15 PROCEDURE — 64616 CHEMODENERV MUSC NECK DYSTON: CPT | Performed by: ANESTHESIOLOGY

## 2024-11-15 NOTE — PROGRESS NOTES
Chronic Pain/PM&R Clinic Note     Encounter Date: 11/15/24    Subjective:   Chief Complaint:   Chief Complaint   Patient presents with    Botox Injection     Cervical Dystonia        History of Present Illness:   Shila Camara is a 59 y.o. female seen in the clinic initially on 09/25/23 upon request from Dana Ortiz MD (PCP) for her history of chronic neck pain and dystonia with tremors.  She has a medical history of rhinoplasty for cosmetic purposes complicated by septal deviation and nose surgeries and septoplasties.  She states that she has been dealing with worsening neck pain, abnormal neck posturing and tremor since 2021 when she lived in [place].  She states that this causes a lot of interference with her ability to function with her job.  She describes a constant pain in her neck and states that it is hard for her to function doing things without have to take medication.  She states that she was getting Botox previously in her neck with significant relief in [place] and in [place], [place].  She states that she does not like to be dependent to repeat her Botox injections to treat her cervical dystonia and tremors.    Today, 11/15/2024, patient presents for planned follow-up for management of neck pain and cervical dystonia.  She continues to follow well to her Botox injections.  She states that her last Botox injections were extremely effective at helping with some of her neck pain and some of the pain radiating up her posterior scalp causing headaches.  She denies any changes in presentation of her overall pain.  She denies any change in medications used to treat her pain.  She would like to repeat her Botox injections at this office visit.    History of Interventions:   Surgery: No previous cervical surgeries  Injections: Botox inj - effective in fast    Imaging/Studies:  None    Past Medical History:   Diagnosis Date    Asthma     Benign essential tremor     Dystonia     With Tremors

## 2024-11-19 NOTE — PROGRESS NOTES
Pre-operative Diagnosis: Cervical Dystonia     Post-operative Diagnosis: Cervical Dystonia     Procedure: Botox for Cervical Dystonia     Procedure Description:  Patient was seated on the examination table. Botox was drawn up into a tuberculin syringes, to a concentration of 5 units per 0.1 mL with a 30-gauge needle. Skin was prepped with alcohol wipes. The following muscles were injected after negative aspiration; left trapezius and RIGHT trapezius, left rhomboids, left cervical paraspinal muscles, and left occipitalis muscle for a total of 200 units. The patient tolerated the procedure well.    Medications: 4 mL in 200 U Botox drawn up in 4 separate 1 mL TB syringes = 5 U per 0.1 mL syringe    Amount medication wasted: 0 units        Procedural Complications: None        Adam Julian DO  Interventional Pain Management/PM&R   [facility] [place]

## 2024-12-20 ENCOUNTER — HOSPITAL ENCOUNTER (EMERGENCY)
Age: 59
Discharge: HOME | End: 2024-12-20
Payer: COMMERCIAL

## 2024-12-20 VITALS
DIASTOLIC BLOOD PRESSURE: 86 MMHG | TEMPERATURE: 100.2 F | WEIGHT: 123 LBS | SYSTOLIC BLOOD PRESSURE: 130 MMHG | BODY MASS INDEX: 24.02 KG/M2 | HEART RATE: 112 BPM | OXYGEN SATURATION: 96 % | RESPIRATION RATE: 20 BRPM

## 2024-12-20 DIAGNOSIS — U07.1 LAB TEST POSITIVE FOR DETECTION OF COVID-19 VIRUS: Primary | ICD-10-CM

## 2024-12-20 LAB — SARS-COV-2 RDRP RESP QL NAA+PROBE: DETECTED

## 2024-12-20 PROCEDURE — 99213 OFFICE O/P EST LOW 20 MIN: CPT

## 2024-12-20 PROCEDURE — 99213 OFFICE O/P EST LOW 20 MIN: CPT | Performed by: NURSE PRACTITIONER

## 2024-12-20 PROCEDURE — 87635 SARS-COV-2 COVID-19 AMP PRB: CPT

## 2024-12-20 RX ORDER — ALBUTEROL SULFATE 90 UG/1
2 INHALANT RESPIRATORY (INHALATION) EVERY 4 HOURS PRN
Qty: 18 G | Refills: 0 | Status: SHIPPED | OUTPATIENT
Start: 2024-12-20

## 2024-12-20 ASSESSMENT — ENCOUNTER SYMPTOMS: COUGH: 1

## 2024-12-20 ASSESSMENT — PAIN - FUNCTIONAL ASSESSMENT: PAIN_FUNCTIONAL_ASSESSMENT: NONE - DENIES PAIN

## 2024-12-20 NOTE — ED PROVIDER NOTES
[facility] URGENT CARE  UrgentCare Encounter      CHIEFCOMPLAINT       Chief Complaint   Patient presents with    Cough     Chest congestion, generalized body aches         Nurses Notes reviewed and I agree except as noted in the HPI.  HISTORY OF PRESENT ILLNESS     Shila Camara is a 59 y.o. female who presents to the urgent care for evaluation.  The history is provided by the patient.   Cold Symptoms  Presenting symptoms: congestion, cough, fatigue and fever (102 °F last night)    Duration:  1 day  Associated symptoms: myalgias    Risk factors: sick contacts (works in [employer])      She is requesting Covid testing due to symptoms and exposure.     The patient/patient representative has no other acute complaints at this time.    REVIEW OF SYSTEMS     Review of Systems   Constitutional:  Positive for fatigue and fever (102 °F last night).   HENT:  Positive for congestion.    Respiratory:  Positive for cough.    Musculoskeletal:  Positive for myalgias.   All other systems reviewed and are negative.      PAST MEDICAL HISTORY         Diagnosis Date    Asthma     Benign essential tremor     Dystonia     With Tremors    Dystonia        SURGICAL HISTORY     Patient  has a past surgical history that includes Hysterectomy, vaginal; Appendectomy (2022); Gallbladder surgery; turbinate resection; and rhinoplasty.    CURRENT MEDICATIONS       Discharge Medication List as of 12/20/2024 10:35 AM        CONTINUE these medications which have NOT CHANGED    Details   propranolol (INDERAL) 10 MG tablet Take 1 tablet by mouth 3 times daily, Disp-270 tablet, R-2Normal      primidone (MYSOLINE) 50 MG tablet Take 1 tablet by mouth 3 times daily, Disp-270 tablet, R-2Normal      venlafaxine (EFFEXOR XR) 37.5 MG extended release capsule Take 1 capsule by mouth daily, Disp-90 capsule, R-2Normal      albuterol sulfate HFA (VENTOLIN HFA) 108 (90 Base) MCG/ACT inhaler Inhale 2 puffs into the lungs 4 times daily as needed  -  Primary    Relevant Medications    nirmatrelvir/ritonavir 300/100 (PAXLOVID, 300/100,) 20 x 150 MG & 10 x 100MG TBPK    albuterol sulfate HFA (PROVENTIL;VENTOLIN;PROAIR) 108 (90 Base) MCG/ACT inhaler               The results of pertinent diagnostic studies and exam findings were discussed with patient/patient representative.   Shared decision-making was performed and patient/patient representative are agreeable that they are suitable for discharge at this time.  The patient’s provisional diagnosis and plan of care were discussed with the patient/patient representative who expressed understanding.  The patient/representative is given strict written and verbal instructions about care at home, including over-the-counter management, prescription information, follow-up, and signs and symptoms of worsening of condition, and the patient/patient representative did verbalize understanding of these instructions.  Patient will go to nearest emergency department if symptoms change or worsen, or for any sign or symptom deemed emergent by the patient or family members. Follow up as an outpatient with PCP within the next 3 days, or sooner if symptoms warrant.       PATIENT REFERRED TO:  Venus Patrick MD  [address] [place] [address]  [phone]    Schedule an appointment as soon as possible for a visit in 3 days  For further evaluation., If symptoms change/worsen, go to the [facility]  [address] [place] [address]  [phone]  Schedule an appointment as soon as possible for a visit in 3 days  if you do not have a family provider please call to establish care, if symptoms change or worsen please go to the nearest emergency room      GABRIEL Rodriguez - CNP    Please note that some or all of this chart was generated using [employer] voice recognition software. Although every effort was made to ensure the accuracy of this automated

## 2024-12-20 NOTE — ED TRIAGE NOTES
Patient to room with c/o productive cough, head congestion, chest congestion, and generalized body aches beginning yesterday. Requests COVID testing. Swab obtained.

## 2025-01-06 SDOH — ECONOMIC STABILITY: FOOD INSECURITY: WITHIN THE PAST 12 MONTHS, THE FOOD YOU BOUGHT JUST DIDN'T LAST AND YOU DIDN'T HAVE MONEY TO GET MORE.: NEVER TRUE

## 2025-01-06 SDOH — ECONOMIC STABILITY: FOOD INSECURITY: WITHIN THE PAST 12 MONTHS, YOU WORRIED THAT YOUR FOOD WOULD RUN OUT BEFORE YOU GOT MONEY TO BUY MORE.: NEVER TRUE

## 2025-01-06 SDOH — ECONOMIC STABILITY: TRANSPORTATION INSECURITY
IN THE PAST 12 MONTHS, HAS LACK OF TRANSPORTATION KEPT YOU FROM MEETINGS, WORK, OR FROM GETTING THINGS NEEDED FOR DAILY LIVING?: NO

## 2025-01-06 SDOH — ECONOMIC STABILITY: INCOME INSECURITY: HOW HARD IS IT FOR YOU TO PAY FOR THE VERY BASICS LIKE FOOD, HOUSING, MEDICAL CARE, AND HEATING?: NOT HARD AT ALL

## 2025-01-06 ASSESSMENT — PATIENT HEALTH QUESTIONNAIRE - PHQ9
2. FEELING DOWN, DEPRESSED OR HOPELESS: NOT AT ALL
6. FEELING BAD ABOUT YOURSELF - OR THAT YOU ARE A FAILURE OR HAVE LET YOURSELF OR YOUR FAMILY DOWN: NOT AT ALL
5. POOR APPETITE OR OVEREATING: NOT AT ALL
SUM OF ALL RESPONSES TO PHQ9 QUESTIONS 1 & 2: 0
1. LITTLE INTEREST OR PLEASURE IN DOING THINGS: NOT AT ALL
9. THOUGHTS THAT YOU WOULD BE BETTER OFF DEAD, OR OF HURTING YOURSELF: NOT AT ALL
SUM OF ALL RESPONSES TO PHQ QUESTIONS 1-9: 1
10. IF YOU CHECKED OFF ANY PROBLEMS, HOW DIFFICULT HAVE THESE PROBLEMS MADE IT FOR YOU TO DO YOUR WORK, TAKE CARE OF THINGS AT HOME, OR GET ALONG WITH OTHER PEOPLE: NOT DIFFICULT AT ALL
7. TROUBLE CONCENTRATING ON THINGS, SUCH AS READING THE NEWSPAPER OR WATCHING TELEVISION: NOT AT ALL
3. TROUBLE FALLING OR STAYING ASLEEP: SEVERAL DAYS
SUM OF ALL RESPONSES TO PHQ QUESTIONS 1-9: 1
8. MOVING OR SPEAKING SO SLOWLY THAT OTHER PEOPLE COULD HAVE NOTICED. OR THE OPPOSITE, BEING SO FIGETY OR RESTLESS THAT YOU HAVE BEEN MOVING AROUND A LOT MORE THAN USUAL: NOT AT ALL
4. FEELING TIRED OR HAVING LITTLE ENERGY: NOT AT ALL
SUM OF ALL RESPONSES TO PHQ QUESTIONS 1-9: 1
SUM OF ALL RESPONSES TO PHQ QUESTIONS 1-9: 1

## 2025-01-09 ENCOUNTER — TELEPHONE (OUTPATIENT)
Dept: FAMILY MEDICINE CLINIC | Age: 60
End: 2025-01-09

## 2025-01-09 DIAGNOSIS — U07.1 LAB TEST POSITIVE FOR DETECTION OF COVID-19 VIRUS: ICD-10-CM

## 2025-01-09 ASSESSMENT — PATIENT HEALTH QUESTIONNAIRE - PHQ9
2. FEELING DOWN, DEPRESSED OR HOPELESS: NOT AT ALL
4. FEELING TIRED OR HAVING LITTLE ENERGY: NOT AT ALL
10. IF YOU CHECKED OFF ANY PROBLEMS, HOW DIFFICULT HAVE THESE PROBLEMS MADE IT FOR YOU TO DO YOUR WORK, TAKE CARE OF THINGS AT HOME, OR GET ALONG WITH OTHER PEOPLE: NOT DIFFICULT AT ALL
6. FEELING BAD ABOUT YOURSELF - OR THAT YOU ARE A FAILURE OR HAVE LET YOURSELF OR YOUR FAMILY DOWN: NOT AT ALL
8. MOVING OR SPEAKING SO SLOWLY THAT OTHER PEOPLE COULD HAVE NOTICED. OR THE OPPOSITE - BEING SO FIDGETY OR RESTLESS THAT YOU HAVE BEEN MOVING AROUND A LOT MORE THAN USUAL: NOT AT ALL
9. THOUGHTS THAT YOU WOULD BE BETTER OFF DEAD, OR OF HURTING YOURSELF: NOT AT ALL
SUM OF ALL RESPONSES TO PHQ QUESTIONS 1-9: 1
5. POOR APPETITE OR OVEREATING: NOT AT ALL
7. TROUBLE CONCENTRATING ON THINGS, SUCH AS READING THE NEWSPAPER OR WATCHING TELEVISION: NOT AT ALL
1. LITTLE INTEREST OR PLEASURE IN DOING THINGS: NOT AT ALL
3. TROUBLE FALLING OR STAYING ASLEEP: SEVERAL DAYS

## 2025-01-09 NOTE — TELEPHONE ENCOUNTER
----- Message from Ondina HUBBARD sent at 1/9/2025  9:19 AM EST -----  Regarding: ECC Message to Provider  ECC Message to Provider    Relationship to Patient: Self     Additional Information The patient is running out inhaler by [email or web address]  --------------------------------------------------------------------------------------------------------------------------    Call Back Information: OK to leave message on voicemail  Preferred Call Back Number: Phone [phone]

## 2025-01-10 RX ORDER — FLUTICASONE PROPIONATE AND SALMETEROL 250; 50 UG/1; UG/1
1 POWDER RESPIRATORY (INHALATION) 2 TIMES DAILY
Qty: 60 EACH | Refills: 3 | Status: SHIPPED | OUTPATIENT
Start: 2025-01-10

## 2025-01-10 RX ORDER — ALBUTEROL SULFATE 90 UG/1
2 INHALANT RESPIRATORY (INHALATION) EVERY 4 HOURS PRN
Qty: 18 G | Refills: 1 | Status: SHIPPED | OUTPATIENT
Start: 2025-01-10

## 2025-01-24 ENCOUNTER — OFFICE VISIT (OUTPATIENT)
Dept: FAMILY MEDICINE CLINIC | Age: 60
End: 2025-01-24

## 2025-01-24 VITALS
RESPIRATION RATE: 18 BRPM | HEIGHT: 60 IN | SYSTOLIC BLOOD PRESSURE: 126 MMHG | WEIGHT: 127.8 LBS | TEMPERATURE: 98 F | HEART RATE: 67 BPM | DIASTOLIC BLOOD PRESSURE: 84 MMHG | BODY MASS INDEX: 25.09 KG/M2 | OXYGEN SATURATION: 99 %

## 2025-01-24 DIAGNOSIS — G25.0 ESSENTIAL TREMOR: ICD-10-CM

## 2025-01-24 DIAGNOSIS — Z11.59 NEED FOR HEPATITIS C SCREENING TEST: ICD-10-CM

## 2025-01-24 DIAGNOSIS — Z13.220 LIPID SCREENING: ICD-10-CM

## 2025-01-24 DIAGNOSIS — G24.9 DYSTONIA: ICD-10-CM

## 2025-01-24 DIAGNOSIS — F33.0 MAJOR DEPRESSIVE DISORDER, RECURRENT EPISODE, MILD (HCC): ICD-10-CM

## 2025-01-24 DIAGNOSIS — J45.50 SEVERE PERSISTENT ASTHMA WITHOUT COMPLICATION: Primary | ICD-10-CM

## 2025-01-24 DIAGNOSIS — Z12.31 ENCOUNTER FOR SCREENING MAMMOGRAM FOR MALIGNANT NEOPLASM OF BREAST: ICD-10-CM

## 2025-01-24 DIAGNOSIS — Z11.4 ENCOUNTER FOR SCREENING FOR HIV: ICD-10-CM

## 2025-01-24 DIAGNOSIS — Z00.00 LABORATORY EXAM ORDERED AS PART OF ROUTINE GENERAL MEDICAL EXAMINATION: ICD-10-CM

## 2025-01-24 RX ORDER — FLUTICASONE PROPIONATE AND SALMETEROL 500; 50 UG/1; UG/1
1 POWDER RESPIRATORY (INHALATION) EVERY 12 HOURS
Qty: 60 EACH | Refills: 3 | Status: SHIPPED | OUTPATIENT
Start: 2025-01-24

## 2025-01-24 RX ORDER — VENLAFAXINE HYDROCHLORIDE 37.5 MG/1
37.5 CAPSULE, EXTENDED RELEASE ORAL DAILY
Qty: 90 CAPSULE | Refills: 2 | Status: SHIPPED | OUTPATIENT
Start: 2025-01-24

## 2025-01-24 RX ORDER — METOPROLOL TARTRATE 50 MG
50 TABLET ORAL 2 TIMES DAILY
Qty: 60 TABLET | Refills: 0 | Status: SHIPPED | OUTPATIENT
Start: 2025-01-24

## 2025-01-24 SDOH — ECONOMIC STABILITY: FOOD INSECURITY: WITHIN THE PAST 12 MONTHS, YOU WORRIED THAT YOUR FOOD WOULD RUN OUT BEFORE YOU GOT MONEY TO BUY MORE.: NEVER TRUE

## 2025-01-24 SDOH — ECONOMIC STABILITY: FOOD INSECURITY: WITHIN THE PAST 12 MONTHS, THE FOOD YOU BOUGHT JUST DIDN'T LAST AND YOU DIDN'T HAVE MONEY TO GET MORE.: NEVER TRUE

## 2025-01-24 ASSESSMENT — ENCOUNTER SYMPTOMS
SHORTNESS OF BREATH: 1
CHEST TIGHTNESS: 1
COUGH: 1
WHEEZING: 1

## 2025-01-24 NOTE — PROGRESS NOTES
After pharmacist chart review, the following recommendations are made:    Please consider discontinuing propranolol 10 mg TID and initiating a selective beta blocker (metoprolol tartrate 50 mg BID) to treat tremors.  Consider discontinuing Advair and initiating Dulera /5 mcg/act - 2 puffs 2x daily to promote faster onset of LABA.       For Pharmacy Admin Tracking Only    Program: Medical Group  CPA in place:  No  Recommendation Provided To: Provider: 4 via Note to Provider and Verbally to provider  Intervention Detail: Discontinued Rx: 2, reason: JESSI, Ineffective Therapy and New Rx: 2, reason: JESSI, Needs Additional Therapy  Intervention Accepted By: Provider: 4  Gap Closed?: Yes   Time Spent (min): 30

## 2025-01-24 NOTE — PROGRESS NOTES
Patient:Shila Camara  YOB: 1965   MRN:[id number]    Subjective   59 y.o. female who presents for the following: Follow-up (Patient states she has been getting tonsil stones and would like it checked out and figure out what is causing it. Patient would also like to be checked for diabetes.)    Presents for follow up on asthma   PMH of MDD, PTSD, Essential Tremors, Dystonia, Asthma    Shila presents for asthma follow-up. She states symptoms currently include chest tightness, dyspnea, non-productive cough, and wheezing and occur daily.  Observed precipitants include dust and infection.  Current limitations to exercise and most activity.  She reports using albuterol 4 times per day(s).  She uses the following inhalers Advair and albuterol with montelukast.  She reports benefit from inhalers.  Shila has not been to the ER since their last office visit. Last exacerbation was 10 years prior     Reports symptoms has been persistent for over 3 years since moving to [place], [place]  Patient reports work exposure with dusts, and cold  Has performed  a sleep study in the past 10 years prior negative study.   Has hx rhinoplasty, and multiple nasal revision surgeries.   Patient has hx of essential tremor 2/2 dystonia. Follows with neurology. Currently on primidone and propanolol. Attempted to wean propanolol, but unable to tolerate d/t worsen tremors.   Patient started Botox injection with neurology, which has significantly improved syx.   Patient also reports recurrent tonsolith which associated with bad breath, and mucus production. Denies any dysphagia/odynophagia, sore throat, fever, chills. Currently managed with antiseptic mouth rinses.  MDD. Moods are stable    Screening  Breast cancer- due to repeat  Pap Smear- completed  Colon cancer screening- completed 2022- will send records.  Lung cancer screening- Not indicated  Dexa scan- not indicated.   Vaccines- will receive at local

## 2025-01-24 NOTE — PROGRESS NOTES
Attending attestation:  I personally performed and participated key or critical portions of the evaluation and management including personally performing the exam and medical decision making.  I verify the accuracy of the documentation by the resident with the following addition or changes: Here for asthma follow-up.  Severe persistent asthma on advair, albuterol and montelukast. Using albuterol 4 times daily and waking at night with symptoms.  Also with essential tremor and dystonia on propranolol and primidone which may exacerbate symptoms. Tried stopping propranolol and tremors come back.  Just had Botox this year since met deductible which helped but cannot continue this given cost. Follows with neurology; will get records.  Will bump Advair to 500.  Twin sister reportedly did better on biologics so will consider referring to pulmonolog to consider this option.  Mood stable on Effexor. Refilled this.  Has also had bad breath and recurrent tonsilliths; can self express. Let us know if symptoms of infection or if not working.  Could refer to ENT. No tonsilliths on resident exam today.  Will check labs and re-check in 1 month. Encourage vaccinations. Will get mammogram ordered.  Had colonoscopy and will get us records.        Electronically signed by Sejal Baird MD on 1/24/2025 at 8:48 AM

## 2025-01-31 ENCOUNTER — HOSPITAL ENCOUNTER (OUTPATIENT)
Age: 60
Discharge: HOME | End: 2025-01-31
Payer: COMMERCIAL

## 2025-01-31 DIAGNOSIS — Z11.4 ENCOUNTER FOR SCREENING FOR HIV: ICD-10-CM

## 2025-01-31 DIAGNOSIS — Z11.59 NEED FOR HEPATITIS C SCREENING TEST: ICD-10-CM

## 2025-01-31 DIAGNOSIS — Z00.00 LABORATORY EXAM ORDERED AS PART OF ROUTINE GENERAL MEDICAL EXAMINATION: ICD-10-CM

## 2025-01-31 DIAGNOSIS — Z13.220 LIPID SCREENING: ICD-10-CM

## 2025-01-31 LAB
ALBUMIN SERPL BCG-MCNC: 4 G/DL (ref 3.5–5.1)
ALP SERPL-CCNC: 103 U/L (ref 38–126)
ALT SERPL W/O P-5'-P-CCNC: 39 U/L (ref 11–66)
ANION GAP SERPL CALC-SCNC: 13 MEQ/L (ref 8–16)
AST SERPL-CCNC: 23 U/L (ref 5–40)
BASOPHILS ABSOLUTE: 0 THOU/MM3 (ref 0–0.1)
BASOPHILS NFR BLD AUTO: 0.5 %
BILIRUB SERPL-MCNC: 0.3 MG/DL (ref 0.3–1.2)
BUN SERPL-MCNC: 16 MG/DL (ref 7–22)
CALCIUM SERPL-MCNC: 9.4 MG/DL (ref 8.5–10.5)
CHLORIDE SERPL-SCNC: 104 MEQ/L (ref 98–111)
CHOLEST SERPL-MCNC: 225 MG/DL (ref 100–199)
CO2 SERPL-SCNC: 24 MEQ/L (ref 23–33)
CREAT SERPL-MCNC: 0.6 MG/DL (ref 0.4–1.2)
DEPRECATED MEAN GLUCOSE BLD GHB EST-ACNC: 102 MG/DL (ref 70–126)
DEPRECATED RDW RBC AUTO: 45.3 FL (ref 35–45)
EOSINOPHIL NFR BLD AUTO: 3.3 %
EOSINOPHILS ABSOLUTE: 0.2 THOU/MM3 (ref 0–0.4)
ERYTHROCYTE [DISTWIDTH] IN BLOOD BY AUTOMATED COUNT: 13.5 % (ref 11.5–14.5)
GFR SERPL CREATININE-BSD FRML MDRD: > 90 ML/MIN/1.73M2
GLUCOSE FASTING: 89 MG/DL (ref 70–108)
HBA1C MFR BLD HPLC: 5.4 % (ref 4.4–6.4)
HCT VFR BLD AUTO: 42.7 % (ref 37–47)
HCV IGG SERPL QL IA: NEGATIVE
HDLC SERPL-MCNC: 73 MG/DL
HGB BLD-MCNC: 14 GM/DL (ref 12–16)
HIV 1+2 AB+HIV1 P24 AG SERPL QL IA: NORMAL
IMM GRANULOCYTES # BLD AUTO: 0.02 THOU/MM3 (ref 0–0.07)
IMM GRANULOCYTES NFR BLD AUTO: 0.3 %
LDLC SERPL CALC-MCNC: 136 MG/DL
LYMPHOCYTES ABSOLUTE: 2.1 THOU/MM3 (ref 1–4.8)
LYMPHOCYTES NFR BLD AUTO: 28.9 %
MCH RBC QN AUTO: 30.1 PG (ref 26–33)
MCHC RBC AUTO-ENTMCNC: 32.8 GM/DL (ref 32.2–35.5)
MCV RBC AUTO: 91.8 FL (ref 81–99)
MONOCYTES ABSOLUTE: 0.4 THOU/MM3 (ref 0.4–1.3)
MONOCYTES NFR BLD AUTO: 4.8 %
NEUTROPHILS ABSOLUTE: 4.5 THOU/MM3 (ref 1.8–7.7)
NEUTROPHILS NFR BLD AUTO: 62.2 %
NRBC BLD AUTO-RTO: 0 /100 WBC
PLATELET # BLD AUTO: 354 THOU/MM3 (ref 130–400)
PMV BLD AUTO: 9.2 FL (ref 9.4–12.4)
POTASSIUM SERPL-SCNC: 4.5 MEQ/L (ref 3.5–5.2)
PROT SERPL-MCNC: 7.2 G/DL (ref 6.1–8)
RBC # BLD AUTO: 4.65 MILL/MM3 (ref 4.2–5.4)
SODIUM SERPL-SCNC: 141 MEQ/L (ref 135–145)
TRIGL SERPL-MCNC: 78 MG/DL (ref 0–199)
TSH SERPL DL<=0.005 MIU/L-ACNC: 1.91 UIU/ML (ref 0.4–4.2)
WBC # BLD AUTO: 7.3 THOU/MM3 (ref 4.8–10.8)

## 2025-01-31 PROCEDURE — 36415 COLL VENOUS BLD VENIPUNCTURE: CPT

## 2025-01-31 PROCEDURE — 83036 HEMOGLOBIN GLYCOSYLATED A1C: CPT

## 2025-01-31 PROCEDURE — 86803 HEPATITIS C AB TEST: CPT

## 2025-01-31 PROCEDURE — 84443 ASSAY THYROID STIM HORMONE: CPT

## 2025-01-31 PROCEDURE — 87389 HIV-1 AG W/HIV-1&-2 AB AG IA: CPT

## 2025-01-31 PROCEDURE — 80061 LIPID PANEL: CPT

## 2025-01-31 PROCEDURE — 80053 COMPREHEN METABOLIC PANEL: CPT

## 2025-01-31 PROCEDURE — 85025 COMPLETE CBC W/AUTO DIFF WBC: CPT

## 2025-02-06 ENCOUNTER — TELEPHONE (OUTPATIENT)
Dept: PHYSICAL MEDICINE AND REHAB | Age: 60
End: 2025-02-06

## 2025-02-06 NOTE — TELEPHONE ENCOUNTER
Patient has an appointment tomorrow for botox with Dr. Julian, Would like an estimate of how much it would come out to, has concerns that even though it is approved she may have to pay out of her pocket, would like to be contacted back as soon as possible

## 2025-02-07 ENCOUNTER — OFFICE VISIT (OUTPATIENT)
Dept: PHYSICAL MEDICINE AND REHAB | Age: 60
End: 2025-02-07

## 2025-02-07 VITALS
WEIGHT: 127.87 LBS | SYSTOLIC BLOOD PRESSURE: 134 MMHG | DIASTOLIC BLOOD PRESSURE: 60 MMHG | BODY MASS INDEX: 25.1 KG/M2 | HEIGHT: 60 IN

## 2025-02-07 DIAGNOSIS — G24.3 CERVICAL DYSTONIA: Primary | ICD-10-CM

## 2025-02-07 DIAGNOSIS — G89.29 OTHER CHRONIC PAIN: ICD-10-CM

## 2025-02-07 DIAGNOSIS — G25.0 ESSENTIAL TREMOR: ICD-10-CM

## 2025-02-07 DIAGNOSIS — M47.812 CERVICAL SPONDYLOSIS: ICD-10-CM

## 2025-02-07 NOTE — PROGRESS NOTES
Chronic Pain/PM&R Clinic Note     Encounter Date: 2/7/25    Subjective:   Chief Complaint:   Chief Complaint   Patient presents with    Botox Injection     Botox cervical dystonia 200 units        History of Present Illness:   Shila Camara is a 59 y.o. female seen in the clinic initially on 09/25/23 upon request from Dana Ortiz MD (PCP) for her history of chronic neck pain and dystonia with tremors.  She has a medical history of rhinoplasty for cosmetic purposes complicated by septal deviation and nose surgeries and septoplasties.  She states that she has been dealing with worsening neck pain, abnormal neck posturing and tremor since 2021 when she lived in [place].  She states that this causes a lot of interference with her ability to function with her job.  She describes a constant pain in her neck and states that it is hard for her to function doing things without have to take medication.  She states that she was getting Botox previously in her neck with significant relief in [place] and in [place], [place].  She states that she does not like to be dependent to repeat her Botox injections to treat her cervical dystonia and tremors.    Today, 2/7/25, patient presents for planned follow-up for management of ongoing neck pain and cervical dystonia.  She continues to respond well to her Botox injections.  She is wonder about the cost of these injections since that is the first of the year.  She is changed up some of her medications for her tremor and states that she was doing well with propranolol but could not tolerate the side effects and got switched over to metoprolol.  She states that she does take a small amount of her Xanax which can help in some circumstances but she does not take this very often as she states that she had a refill on this medication over 3 years ago in [place].    History of Interventions:   Surgery: No previous cervical surgeries  Injections: Botox inj - effective in

## 2025-02-07 NOTE — PROGRESS NOTES
[facility] PROFESSIONAL SERVS  [facility] NEUROSCIENCE AND REHABILITATION [address]  [facility] [address]  Dept: [phone]  Dept Fax: [phone]  Loc: [phone]    Visit Date: 2/7/2025    Functionality Assessment/Goals Worksheet     On a scale of 0 (Does not Interfere) to 10 (Completely Interferes)     1.  Which number describes how during the past week pain has interfered with       the following:  A.  General Activity:  4  B.  Mood: 2  C.  Walking Ability:  8  D.  Normal Work (Includes both work outside the home and housework):  0  E.  Relations with Other People:   5  F.  Sleep:   4  G.  Enjoyment of Life:   4    2.  Patient Prefers to Take their Pain Medications:     []  On a regular basis   []  Only when necessary    [x]  Does not take pain medications    3.  What are the Patient's Goals/Expectations for Visiting Pain Management?     []  Learn about my pain    []  Receive Medication   []  Physical Therapy     []  Treat Depression   [x]  Receive Injections    []  Treat Sleep   []  Deal with Anxiety and Stress   []  Treat Opoid Dependence/Addiction   []  Other:

## 2025-02-07 NOTE — PROGRESS NOTES
Pre-operative Diagnosis: Cervical Dystonia     Post-operative Diagnosis: Cervical Dystonia     Procedure: Botox for Cervical Dystonia     Procedure Description:  Patient was seated on the examination table. Botox was drawn up into a tuberculin syringes, to a concentration of 5 units per 0.1 mL with a 30-gauge needle. Skin was prepped with alcohol wipes. The following muscles were injected after negative aspiration; bilateral cervical paraspinal muscles and bilateral trapezius muscles muscle for a total of 200 units. The patient tolerated the procedure well.    Medications: 4 mL in 200 U Botox drawn up in 4 separate 1 mL TB syringes = 5 U per 0.1 mL syringe    Amount medication wasted: 0 units        Procedural Complications: None        Adam Julian DO  Interventional Pain Management/PM&R   [facility]

## 2025-02-27 ENCOUNTER — OFFICE VISIT (OUTPATIENT)
Dept: PULMONOLOGY | Age: 60
End: 2025-02-27
Payer: COMMERCIAL

## 2025-02-27 VITALS
WEIGHT: 129.2 LBS | OXYGEN SATURATION: 98 % | SYSTOLIC BLOOD PRESSURE: 116 MMHG | HEIGHT: 60 IN | BODY MASS INDEX: 25.36 KG/M2 | HEART RATE: 58 BPM | TEMPERATURE: 97.8 F | DIASTOLIC BLOOD PRESSURE: 74 MMHG

## 2025-02-27 DIAGNOSIS — J45.50 SEVERE PERSISTENT ASTHMA WITHOUT COMPLICATION (HCC): Primary | ICD-10-CM

## 2025-02-27 PROBLEM — K21.9 GERD (GASTROESOPHAGEAL REFLUX DISEASE): Status: RESOLVED | Noted: 2022-01-04 | Resolved: 2025-02-27

## 2025-02-27 PROBLEM — F43.12 NIGHTMARES ASSOCIATED WITH CHRONIC POST-TRAUMATIC STRESS DISORDER: Status: RESOLVED | Noted: 2023-01-26 | Resolved: 2025-02-27

## 2025-02-27 PROBLEM — F51.5 NIGHTMARES ASSOCIATED WITH CHRONIC POST-TRAUMATIC STRESS DISORDER: Status: RESOLVED | Noted: 2023-01-26 | Resolved: 2025-02-27

## 2025-02-27 PROCEDURE — 99204 OFFICE O/P NEW MOD 45 MIN: CPT | Performed by: INTERNAL MEDICINE

## 2025-02-27 ASSESSMENT — ENCOUNTER SYMPTOMS
SHORTNESS OF BREATH: 1
DIFFICULTY BREATHING: 1
WHEEZING: 1
TROUBLE SWALLOWING: 0
HEMOPTYSIS: 0
COUGH: 1
HOARSE VOICE: 1
SPUTUM PRODUCTION: 1

## 2025-02-27 NOTE — PROGRESS NOTES
[facility]    Patient: SAAD NG, 59 y.o.   : 1965         Subjective     Chief Complaint   Patient presents with    New Patient     Pulm consult. Ref ogu. Severe persistent asthma w/o complication . Cxr 3. most recent .         Asthma  She complains of cough, difficulty breathing, hoarse voice, shortness of breath, sputum production (every day for last 5 weeks green mucus and coughing up green mucus too.) and wheezing. There is no hemoptysis. This is a chronic problem. The problem has been gradually worsening. The cough is productive of sputum (recently). Associated symptoms include dyspnea on exertion. Pertinent negatives include no trouble swallowing or weight loss. Her symptoms are alleviated by beta-agonist and steroid inhaler. She reports moderate (still using rescue inhaler 3-4x a week. Last year used oral steroids in fall. Usually uses once-2x a year for oral steroids.) improvement on treatment. Her past medical history is significant for asthma. There is no history of bronchiectasis, COPD, emphysema or pneumonia.       Patient has not been admitted or treated for pneumonia, pulmonary embolism, or respiratory failure.  Patient has not been intubated for reasons other than planned procedures.      Social History  Patient job history included, warehouse and works around some dust. No asbestos.   denies living on a farm that primarily raised crops, livestock or combination  denies passive tobacco exposure from parents or work environment.  denies to active tobacco smoking   has exposure to pets/animals at home. Two dogs. 12 years. Dont sleep in bed.   denies exposure to tuberculosis.    Flu vaccine?  yes  Pneumonia vaccine? Not yet   Covid? yes  RSV? Not RSV   Had one shingles needs another.     Past Medical hx   PMH:  Past Medical History:   Diagnosis Date    Asthma     Benign essential tremor     Depression     Dystonia     With Tremors    Dystonia

## 2025-03-01 ENCOUNTER — LAB (OUTPATIENT)
Dept: LAB | Age: 60
End: 2025-03-01

## 2025-03-01 DIAGNOSIS — J45.50 SEVERE PERSISTENT ASTHMA WITHOUT COMPLICATION (HCC): ICD-10-CM

## 2025-03-01 LAB — IGE SERPL-ACNC: 242 IU/ML (ref 0–100)

## 2025-03-05 LAB
ANCA AB PATTERN SER IF-IMP: NORMAL
ANCA IGG TITR SER IF: NORMAL {TITER}
ASPERGILLUS AB TITR SER CF: NORMAL {TITER}

## 2025-03-08 LAB
A FLAVUS AB SER QL ID: ABNORMAL
A FUMIGATUS1 AB SER QL ID: ABNORMAL
A FUMIGATUS2 AB SER QL ID: ABNORMAL
A FUMIGATUS3 AB SER QL ID: ABNORMAL
A FUMIGATUS6 AB SER QL ID: ABNORMAL
A PULLULANS AB SER QL ID: ABNORMAL
ANNOTATION COMMENT IMP: ABNORMAL
BEEF IGE QN: < 0.1 KU/L
EPID ALLERG MIX73 IGE QN: NEGATIVE KU/L
P BETAE IGE QN: 4.86 KU/L
PIGEON SERUM AB QL ID: ABNORMAL
PORK IGE QN: < 0.1 KU/L
S RECTIVIRGULA AB SER QL ID: ABNORMAL
S VIRIDIS AB SER QL ID: ABNORMAL
T CANDIDUS AB SER QL: ABNORMAL

## 2025-03-12 ENCOUNTER — TELEPHONE (OUTPATIENT)
Dept: PULMONOLOGY | Age: 60
End: 2025-03-12

## 2025-03-12 NOTE — TELEPHONE ENCOUNTER
PA completed on covermymeds for [employer]. Waiting insurance determination.   Patient currently receveing through [facility] program through [employer].

## 2025-03-18 DIAGNOSIS — J45.50 SEVERE PERSISTENT ASTHMA WITHOUT COMPLICATION (HCC): Primary | ICD-10-CM

## 2025-03-20 ENCOUNTER — OFFICE VISIT (OUTPATIENT)
Dept: FAMILY MEDICINE CLINIC | Age: 60
End: 2025-03-20
Payer: COMMERCIAL

## 2025-03-20 VITALS
BODY MASS INDEX: 24.97 KG/M2 | HEIGHT: 60 IN | WEIGHT: 127.2 LBS | OXYGEN SATURATION: 98 % | SYSTOLIC BLOOD PRESSURE: 118 MMHG | TEMPERATURE: 97.7 F | HEART RATE: 68 BPM | DIASTOLIC BLOOD PRESSURE: 70 MMHG | RESPIRATION RATE: 12 BRPM

## 2025-03-20 DIAGNOSIS — G25.0 ESSENTIAL TREMOR: Primary | ICD-10-CM

## 2025-03-20 DIAGNOSIS — G24.9 DYSTONIA: ICD-10-CM

## 2025-03-20 DIAGNOSIS — Z12.31 BREAST CANCER SCREENING BY MAMMOGRAM: ICD-10-CM

## 2025-03-20 PROCEDURE — 99213 OFFICE O/P EST LOW 20 MIN: CPT

## 2025-03-20 RX ORDER — METOPROLOL TARTRATE 75 MG/1
50 TABLET ORAL 2 TIMES DAILY
Qty: 60 TABLET | Refills: 2 | Status: SHIPPED | OUTPATIENT
Start: 2025-03-20

## 2025-03-20 RX ORDER — PRIMIDONE 50 MG/1
50 TABLET ORAL 3 TIMES DAILY
Qty: 270 TABLET | Refills: 2 | Status: SHIPPED | OUTPATIENT
Start: 2025-03-20

## 2025-03-20 NOTE — PROGRESS NOTES
Health Maintenance Due   Topic Date Due    Hepatitis B vaccine (1 of 3 - 19+ 3-dose series) Never done    Shingles vaccine (2 of 3) 03/10/2017    Pneumococcal 50+ years Vaccine (2 of 2 - PCV) 04/19/2019    Breast cancer screen  07/27/2023    COVID-19 Vaccine (4 - 2024-25 season) 09/01/2024       
S: 59 y.o. female with   Chief Complaint   Patient presents with    Follow-up     2 Month Follow-up        HPI: please see resident note for HPI and ROS.    The 10-year ASCVD risk score (Sami MCGEE, et al., 2019) is: 2%    Values used to calculate the score:      Age: 59 years      Sex: Female      Is Non- : No      Diabetic: No      Tobacco smoker: No      Systolic Blood Pressure: 118 mmHg      Is BP treated: No      HDL Cholesterol: 73 mg/dL      Total Cholesterol: 225 mg/dL    BP Readings from Last 3 Encounters:   03/20/25 118/70   02/27/25 116/74   02/07/25 134/60     Wt Readings from Last 3 Encounters:   03/20/25 57.7 kg (127 lb 3.2 oz)   02/27/25 58.6 kg (129 lb 3.2 oz)   02/07/25 58 kg (127 lb 13.9 oz)       O: VS:  height is 1.524 m (5') and weight is 57.7 kg (127 lb 3.2 oz). Her oral temperature is 97.7 °F (36.5 °C). Her blood pressure is 118/70 and her pulse is 68. Her respiration is 12 and oxygen saturation is 98%.        Diagnosis Orders   1. Essential tremor  primidone (MYSOLINE) 50 MG tablet    metoprolol tartrate 75 MG TABS      2. Dystonia  primidone (MYSOLINE) 50 MG tablet    metoprolol tartrate 75 MG TABS      3. Breast cancer screening by mammogram  YOLANDA DIGITAL SCREEN W OR WO CAD BILATERAL          Plan:  Please refer to resident note for full plan.    59-year-old female here for follow up. Started on Trelegy by pulmonology; breathing significantly improved. Essential tremor worsened slightly since she stopped propranolol. OK to increase metoprolol to 75 mg BID to see if this helps. Continue Primidone. Cholesterol elevated but ASCVD risk 2%. Lifestyle changes discussed. Follow up in 1 month.     Patient left before I was able to see her in the office.    Health Maintenance Due   Topic Date Due    Hepatitis B vaccine (1 of 3 - 19+ 3-dose series) Never done    Shingles vaccine (2 of 3) 03/10/2017    Pneumococcal 50+ years Vaccine (2 of 2 - PCV) 04/19/2019    Breast cancer 
Future  2. Essential tremor  -     primidone (MYSOLINE) 50 MG tablet; Take 1 tablet by mouth 3 times daily, Disp-270 tablet, R-2Normal  -     metoprolol tartrate 75 MG TABS; Take 50 mg by mouth 2 times daily, Disp-60 tablet, R-2Normal  3. Dystonia  -     primidone (MYSOLINE) 50 MG tablet; Take 1 tablet by mouth 3 times daily, Disp-270 tablet, R-2Normal  -     metoprolol tartrate 75 MG TABS; Take 50 mg by mouth 2 times daily, Disp-60 tablet, R-2Normal    58 y/o female with hx of severe persistent asthma, tremors, and dystonia presents for follow up on labs and asthma syx.   Asthma currently controlled on trelegy. Advised to continue Trelegy as prescribed by pulmonology  Essential Tremors/dystonia. Chronically controlled on Propanolol. Syx is currently Uncontrolled since switching from propanolol to metoprolol d/t concern for bronchospasm/worsen air reactivity on Propanolol.  We will increase metoprolol from 50 mg BID to 75 mg BID for essential tremors/dystonia since patient is unable to tolerate increase in primidone. Can consider increase to 100mg BID if HR is able to tolerate increase. However if syx is unrelenting, will consider consult to neurology?  Labs were reviewed and discussed. Significant for Elevated Cholesterol. The 10-year ASCVD risk score (Ocala DK, et al., 2019) is: 2% No indication for statins. Encouraged Lifestyle modification.     Return in about 4 weeks (around 4/17/2025) for dystonia, tremoir and asthma.      An electronic signature was used to authenticate this note  - Venus Patrick MD PGY-2 on 3/20/2025 at 6:08 PM

## 2025-03-20 NOTE — PATIENT INSTRUCTIONS
Thank you   Thank you for trusting us with your healthcare needs. You may receive a survey regarding today's visit. It would help us out if you would take a few moments to provide your feedback. We value your input.  Please bring in ALL medications BOTTLES, including inhalers, herbal supplements, over the counter, prescribed & non-prescribed medicine. The office would like actual medication bottles and a list.         4.  Prior to getting your labs drawn, check with your insurance company for benefits and eligibility of lab services.  Often, insurance companies cover certain tests for preventative visits only.  It is patient's responsibility to    see what is covered.    5.  If the list below has been completed, PLEASE FAX RECORDS TO OUR OFFICE @ [phone]. Once the records have been received we will update your records at our office:  Health Maintenance Due   Topic Date Due    Hepatitis B vaccine (1 of 3 - 19+ 3-dose series) Never done    Shingles vaccine (2 of 3) 03/10/2017    Pneumococcal 50+ years Vaccine (2 of 2 - PCV) 04/19/2019    Breast cancer screen  07/27/2023    COVID-19 Vaccine (4 - 2024-25 season) 09/01/2024

## 2025-03-31 ENCOUNTER — HOSPITAL ENCOUNTER (EMERGENCY)
Age: 60
Discharge: HOME | End: 2025-03-31
Payer: COMMERCIAL

## 2025-03-31 VITALS
RESPIRATION RATE: 16 BRPM | HEART RATE: 76 BPM | TEMPERATURE: 98.4 F | SYSTOLIC BLOOD PRESSURE: 116 MMHG | OXYGEN SATURATION: 96 % | DIASTOLIC BLOOD PRESSURE: 61 MMHG

## 2025-03-31 DIAGNOSIS — J32.4 CHRONIC PANSINUSITIS: Primary | ICD-10-CM

## 2025-03-31 LAB
S PYO AG THROAT QL: NEGATIVE
SARS-COV-2 RDRP RESP QL NAA+PROBE: NOT  DETECTED

## 2025-03-31 PROCEDURE — 99213 OFFICE O/P EST LOW 20 MIN: CPT

## 2025-03-31 PROCEDURE — 87635 SARS-COV-2 COVID-19 AMP PRB: CPT

## 2025-03-31 PROCEDURE — 87651 STREP A DNA AMP PROBE: CPT

## 2025-03-31 RX ORDER — BROMPHENIRAMINE MALEATE, PSEUDOEPHEDRINE HYDROCHLORIDE, AND DEXTROMETHORPHAN HYDROBROMIDE 2; 30; 10 MG/5ML; MG/5ML; MG/5ML
5 SYRUP ORAL 4 TIMES DAILY PRN
Qty: 118 ML | Refills: 0 | Status: SHIPPED | OUTPATIENT
Start: 2025-03-31 | End: 2025-04-07

## 2025-03-31 ASSESSMENT — ENCOUNTER SYMPTOMS
SINUS PAIN: 1
SORE THROAT: 1
COUGH: 1
VOICE CHANGE: 1
SINUS PRESSURE: 1
GASTROINTESTINAL NEGATIVE: 1

## 2025-03-31 NOTE — ED NOTES
To [facility] with complaints of sore throat, lost voice, headache. Started Thursday. Requests Arminda Carver, RN  03/31/25 1011

## 2025-03-31 NOTE — ED PROVIDER NOTES
ELLIPTA) 200-62.5-25 MCG/ACT AEPB INHALER    Inhale 1 puff into the lungs daily    METOPROLOL TARTRATE 75 MG TABS    Take 50 mg by mouth 2 times daily    MONTELUKAST (SINGULAIR) 10 MG TABLET    Take 1 tablet by mouth daily    PRIMIDONE (MYSOLINE) 50 MG TABLET    Take 1 tablet by mouth 3 times daily    RED YEAST RICE EXTRACT PO    Take by mouth daily    VENLAFAXINE (EFFEXOR XR) 37.5 MG EXTENDED RELEASE CAPSULE    Take 1 capsule by mouth daily    VITAMIN D (CHOLECALCIFEROL) 25 MCG (1000 UT) TABS TABLET    Take by mouth daily    VITAMIN E 400 UNIT CAPSULE    Take 1 capsule by mouth daily       ALLERGIES     Patient is is allergic to keflex [cephalexin].    FAMILY HISTORY     Patient'sfamily history includes Breast Cancer in her maternal grandmother and mother; Cervical Cancer in her mother; Diabetes in her father, maternal grandmother, and mother; Elevated Lipids in her mother; Hypertension in her mother.    SOCIAL HISTORY     Patient  reports that she has never smoked. She has never used smokeless tobacco. She reports current alcohol use. She reports that she does not use drugs.    PHYSICAL EXAM     ED TRIAGE VITALS  BP: 116/61, Temp: 98.4 °F (36.9 °C), Pulse: 76, Respirations: 16, SpO2: 96 %  Physical Exam  Vitals and nursing note reviewed.   HENT:      Right Ear: Tympanic membrane normal.      Left Ear: Tympanic membrane normal.      Nose:      Right Sinus: Maxillary sinus tenderness and frontal sinus tenderness present.      Left Sinus: Maxillary sinus tenderness and frontal sinus tenderness present.      Mouth/Throat:      Pharynx: Uvula midline. Posterior oropharyngeal erythema and postnasal drip present.   Cardiovascular:      Rate and Rhythm: Normal rate.   Pulmonary:      Effort: Pulmonary effort is normal.      Breath sounds: Normal breath sounds.   Abdominal:      Palpations: Abdomen is soft.   Musculoskeletal:      Cervical back: Normal range of motion.   Skin:     General: Skin is warm and dry.    Neurological:      Mental Status: She is alert and oriented to person, place, and time.         DIAGNOSTIC RESULTS   Labs:  Results for orders placed or performed during the hospital encounter of 03/31/25   COVID-19, Rapid    Specimen: Nasopharyngeal Swab   Result Value Ref Range    SARS-CoV-2, KEL NOT  DETECTED NOT DETECTED   Strep Screen Group A Throat   Result Value Ref Range    Rapid Strep A Screen NEGATIVE        IMAGING:  No orders to display     URGENT CARE COURSE:         Medications - No data to display  PROCEDURES:  FINALIMPRESSION      1. Chronic pansinusitis        DISPOSITION/PLAN   DISPOSITION Decision To Discharge 03/31/2025 10:48:49 AM   DISPOSITION CONDITION Stable     Discussed plan of care with patient.  Patient instructed to Rest.  Tylenol Motrin as needed for pain or fever.  Medications as directed.  Drink plenty of fluids.  Follow-up with primary care provider for any new or worsening symptoms go to the emergency department for any other symptoms or concerns deemed emergent.    PATIENT REFERRED TO:  Venus Patrick MD  [address] [place] [address]  [phone]    Call   As needed    DISCHARGE MEDICATIONS:  New Prescriptions    AMOXICILLIN-CLAVULANATE (AUGMENTIN) 875-125 MG PER TABLET    Take 1 tablet by mouth 2 times daily for 7 days    BROMPHENIRAMINE-PSEUDOEPHEDRINE-DM 2-30-10 MG/5ML SYRUP    Take 5 mLs by mouth 4 times daily as needed for Congestion     Current Discharge Medication List          GABRIEL Leigh CNP, Randi, APRN - CNP  03/31/25 [id number]

## 2025-03-31 NOTE — DISCHARGE INSTRUCTIONS
Rest.  Tylenol Motrin as needed for pain or fever.  Medications as directed.  Drink plenty of fluids.  Follow-up with primary care provider for any new or worsening symptoms go to the emergency department for any other symptoms or concerns deemed emergent.

## 2025-04-25 ENCOUNTER — TELEPHONE (OUTPATIENT)
Dept: PULMONOLOGY | Age: 60
End: 2025-04-25